# Patient Record
Sex: FEMALE | Race: WHITE | Employment: FULL TIME | URBAN - METROPOLITAN AREA
[De-identification: names, ages, dates, MRNs, and addresses within clinical notes are randomized per-mention and may not be internally consistent; named-entity substitution may affect disease eponyms.]

---

## 2017-01-05 ENCOUNTER — OFFICE VISIT (OUTPATIENT)
Dept: FAMILY MEDICINE | Facility: CLINIC | Age: 37
End: 2017-01-05

## 2017-01-05 VITALS
HEART RATE: 108 BPM | DIASTOLIC BLOOD PRESSURE: 74 MMHG | BODY MASS INDEX: 21.48 KG/M2 | TEMPERATURE: 97.9 F | HEIGHT: 69 IN | RESPIRATION RATE: 14 BRPM | SYSTOLIC BLOOD PRESSURE: 123 MMHG | WEIGHT: 145 LBS

## 2017-01-05 DIAGNOSIS — F98.8 ADD (ATTENTION DEFICIT DISORDER): Primary | ICD-10-CM

## 2017-01-05 DIAGNOSIS — J45.20 INTERMITTENT ASTHMA, UNCOMPLICATED: ICD-10-CM

## 2017-01-05 PROCEDURE — 99213 OFFICE O/P EST LOW 20 MIN: CPT | Performed by: FAMILY MEDICINE

## 2017-01-05 RX ORDER — DEXTROAMPHETAMINE SACCHARATE, AMPHETAMINE ASPARTATE MONOHYDRATE, DEXTROAMPHETAMINE SULFATE AND AMPHETAMINE SULFATE 7.5; 7.5; 7.5; 7.5 MG/1; MG/1; MG/1; MG/1
30 CAPSULE, EXTENDED RELEASE ORAL DAILY
Qty: 30 CAPSULE | Refills: 0 | Status: SHIPPED | OUTPATIENT
Start: 2017-01-05 | End: 2017-01-31

## 2017-01-05 RX ORDER — ALBUTEROL SULFATE 90 UG/1
2 AEROSOL, METERED RESPIRATORY (INHALATION) EVERY 6 HOURS PRN
Qty: 1 INHALER | Refills: 6 | Status: SHIPPED | OUTPATIENT
Start: 2017-01-05 | End: 2017-09-21

## 2017-01-05 RX ORDER — DEXTROAMPHETAMINE SACCHARATE, AMPHETAMINE ASPARTATE, DEXTROAMPHETAMINE SULFATE AND AMPHETAMINE SULFATE 5; 5; 5; 5 MG/1; MG/1; MG/1; MG/1
20 TABLET ORAL DAILY
Qty: 30 TABLET | Refills: 0 | Status: SHIPPED | OUTPATIENT
Start: 2017-01-05 | End: 2017-01-31

## 2017-01-05 NOTE — NURSING NOTE
"Chief Complaint   Patient presents with     Recheck Medication       Initial Ht 5' 9\" (1.753 m) Estimated body mass index is 22.58 kg/(m^2) as calculated from the following:    Height as of this encounter: 5' 9\" (1.753 m).    Weight as of 11/3/16: 153 lb (69.4 kg).  BP completed using cuff size: regular left arm Penelope Chowdhury MA      "

## 2017-01-05 NOTE — MR AVS SNAPSHOT
"              After Visit Summary   1/5/2017    Delicia Zhou    MRN: [id number]           Patient Information     Date Of Birth          1980        Visit Information        Provider Department      1/5/2017 11:15 AM Zak Nicole MD [facility]        Today's Diagnoses     ADD (attention deficit disorder)    -  1     Intermittent asthma, uncomplicated            Follow-ups after your visit        Who to contact     If you have questions or need follow up information about today's clinic visit or your schedule please contact [facility] directly at [phone].  Normal or non-critical lab and imaging results will be communicated to you by [facility]hart, letter or phone within 4 business days after the clinic has received the results. If you do not hear from us within 7 days, please contact the clinic through [employer]t or phone. If you have a critical or abnormal lab result, we will notify you by phone as soon as possible.  Submit refill requests through [employer]ore or call your pharmacy and they will forward the refill request to us. Please allow 3 business days for your refill to be completed.          Additional Information About Your Visit        MyChart Information     Splore gives you secure access to your electronic health record. If you see a primary care provider, you can also send messages to your care team and make appointments. If you have questions, please call your primary care clinic.  If you do not have a primary care provider, please call [phone] and they will assist you.        Care EveryWhere ID     This is your Care EveryWhere ID. This could be used by other organizations to access your [place] medical records  [id number]        Your Vitals Were     Pulse Temperature Respirations Height BMI (Body Mass Index) Last Period    108 97.9  F (36.6  C) (Oral) 14 5' 9\" (1.753 m) 21.40 kg/m2 01/03/2017    Breastfeeding?                   No            Blood " Pressure from Last 3 Encounters:   01/05/17 123/74   11/03/16 114/66   04/22/16 120/96    Weight from Last 3 Encounters:   01/05/17 145 lb (65.772 kg)   11/03/16 153 lb (69.4 kg)   04/22/16 145 lb 12.8 oz (66.134 kg)              Today, you had the following     No orders found for display         Where to get your medicines      Some of these will need a paper prescription and others can be bought over the counter.  Ask your nurse if you have questions.     Bring a paper prescription for each of these medications    - albuterol 108 (90 BASE) MCG/ACT Inhaler  - amphetamine-dextroamphetamine 20 MG per tablet  - amphetamine-dextroamphetamine 30 MG per 24 hr capsule       Primary Care Provider Office Phone # Fax #    Zak Nicole [phone]       [address] [address]        Thank you!     Thank you for choosing [facility]  for your care. Our goal is always to provide you with excellent care. Hearing back from our patients is one way we can continue to improve our services. Please take a few minutes to complete the written survey that you may receive in the mail after your visit with us. Thank you!             Your Updated Medication List - Protect others around you: Learn how to safely use, store and throw away your medicines at www.disposemymeds.org.          This list is accurate as of: 1/5/17  9:00 PM.  Always use your most recent med list.                   Brand Name Dispense Instructions for use    albuterol 108 (90 BASE) MCG/ACT Inhaler    PROAIR HFA/PROVENTIL HFA/VENTOLIN HFA    1 Inhaler    Inhale 2 puffs into the lungs every 6 hours as needed for shortness of breath / dyspnea       * amphetamine-dextroamphetamine 20 MG per tablet    ADDERALL    30 tablet    Take 1 tablet (20 mg) by mouth daily       * amphetamine-dextroamphetamine 30 MG per 24 hr capsule    ADDERALL XR    30 capsule    Take 1 capsule (30 mg) by mouth daily        cetirizine 10 MG tablet    zyrTEC    90 tablet    Take 1 tablet (10 mg) by mouth every evening       * Notice:  This list has 2 medication(s) that are the same as other medications prescribed for you. Read the directions carefully, and ask your doctor or other care provider to review them with you.

## 2017-01-05 NOTE — PROGRESS NOTES
"  SUBJECTIVE:                                                    Delicia Zhou is a 36 year old female who presents to clinic today for the following health issues:      Medication Followup of Adderall    Taking Medication as prescribed: yes    Side Effects:  None    Medication Helping Symptoms:  yes     ADD (attention deficit disorder)  (primary encounter diagnosis) -Some medicine confusion, controlled contract violation, pt claims unaware of details of contract (\" I never read anything\") had testing in 2015:  no record      Intermittent asthma, uncomplicated - Well controlled.      ROS:  Sleeps well      This document serves as a record of the services and decisions personally performed and made by Zak Nicole MD. It was created on his behalf by Linda Macdonald, a trained medical scribe. The creation of this document is based the provider's statements to the medical scribe. 11:53 AM January 5, 2017    OBJECTIVE:                                                    /74 mmHg  Pulse 108  Temp(Src) 97.9  F (36.6  C) (Oral)  Resp 14  Ht 5' 9\" (1.753 m)  Wt 145 lb (65.772 kg)  BMI 21.40 kg/m2  LMP 01/03/2017  Breastfeeding? No  Body mass index is 21.4 kg/(m^2).              ASSESSMENT/PLAN:                                                      (F98.8) ADD (attention deficit disorder)  (primary encounter diagnosis)  Comment: controlled substance agreement filed  Records re psychevalrequested. Visit q 6 mos if all copacetic  Plan: amphetamine-dextroamphetamine (ADDERALL) 20 MG         per tablet, amphetamine-dextroamphetamine         (ADDERALL XR) 30 MG per 24 hr capsule    (J45.20) Intermittent asthma, uncomplicated  Comment: Controlled. refill  Plan: albuterol (PROAIR HFA/PROVENTIL HFA/VENTOLIN         HFA) 108 (90 BASE) MCG/ACT Inhaler          RCK in 6 mos. 1 mos Rx today  Review for receipt of records before refill    The information in this document, created by a scribe for me, accurately reflects the " services I personally performed and the decisions made by me. I have reviewed and approved this document for accuracy.  11:54 AM January 5, 2017    Zak Nicole MD  [facility]

## 2017-01-17 ENCOUNTER — TELEPHONE (OUTPATIENT)
Dept: FAMILY MEDICINE | Facility: CLINIC | Age: 37
End: 2017-01-17

## 2017-01-17 ENCOUNTER — E-VISIT (OUTPATIENT)
Dept: FAMILY MEDICINE | Facility: CLINIC | Age: 37
End: 2017-01-17

## 2017-01-17 DIAGNOSIS — F41.9 ANXIETY: Primary | ICD-10-CM

## 2017-01-17 PROCEDURE — 99444 ZZC PHYSICIAN ONLINE EVALUATION & MANAGEMENT SERVICE: CPT | Performed by: FAMILY MEDICINE

## 2017-01-17 RX ORDER — BUPROPION HYDROCHLORIDE 150 MG/1
150 TABLET ORAL EVERY MORNING
Qty: 30 TABLET | Refills: 1 | Status: SHIPPED | OUTPATIENT
Start: 2017-01-17 | End: 2017-09-21

## 2017-01-17 NOTE — TELEPHONE ENCOUNTER
Pt calls re anxiety, would like to start Rx (specified wellbutrin) informed visit needed for new Rx.   Pt will try submitting e-visit and if OV is required per Dr. Nicole will scheduled.   Last OV 1/5/17 anxiety discussed.   Jesu Sharma, RN

## 2017-01-31 DIAGNOSIS — F98.8 ADD (ATTENTION DEFICIT DISORDER): Primary | ICD-10-CM

## 2017-01-31 RX ORDER — DEXTROAMPHETAMINE SACCHARATE, AMPHETAMINE ASPARTATE, DEXTROAMPHETAMINE SULFATE AND AMPHETAMINE SULFATE 5; 5; 5; 5 MG/1; MG/1; MG/1; MG/1
20 TABLET ORAL DAILY
Qty: 30 TABLET | Refills: 0 | Status: SHIPPED | OUTPATIENT
Start: 2017-03-06 | End: 2017-03-30

## 2017-01-31 RX ORDER — DEXTROAMPHETAMINE SACCHARATE, AMPHETAMINE ASPARTATE MONOHYDRATE, DEXTROAMPHETAMINE SULFATE AND AMPHETAMINE SULFATE 7.5; 7.5; 7.5; 7.5 MG/1; MG/1; MG/1; MG/1
30 CAPSULE, EXTENDED RELEASE ORAL DAILY
Qty: 30 CAPSULE | Refills: 0 | Status: SHIPPED | OUTPATIENT
Start: 2017-03-06 | End: 2017-03-30

## 2017-01-31 RX ORDER — DEXTROAMPHETAMINE SACCHARATE, AMPHETAMINE ASPARTATE, DEXTROAMPHETAMINE SULFATE AND AMPHETAMINE SULFATE 5; 5; 5; 5 MG/1; MG/1; MG/1; MG/1
20 TABLET ORAL DAILY
Qty: 30 TABLET | Refills: 0 | Status: SHIPPED | OUTPATIENT
Start: 2017-02-04 | End: 2017-01-31

## 2017-01-31 RX ORDER — DEXTROAMPHETAMINE SACCHARATE, AMPHETAMINE ASPARTATE MONOHYDRATE, DEXTROAMPHETAMINE SULFATE AND AMPHETAMINE SULFATE 7.5; 7.5; 7.5; 7.5 MG/1; MG/1; MG/1; MG/1
30 CAPSULE, EXTENDED RELEASE ORAL DAILY
Qty: 30 CAPSULE | Refills: 0 | Status: SHIPPED | OUTPATIENT
Start: 2017-02-04 | End: 2017-01-31

## 2017-01-31 NOTE — TELEPHONE ENCOUNTER
Controlled Substance Refill Request for Adderall 20mg and 30mg-pt will be in area tomorrow, would like to  then, knows it is due 02/04/17  Problem List Complete:  Yes  4/24/14 note diagnostic criteria copied  11/2015 pt reports pregnancy amphetamine use to minimize weight gain    Patient is followed by SCOTT MAYO for ongoing prescription of stimulants.  All refills should be approved by this provider, or covering partner.    Medication(s): Adderall 20 mg and XR 30 mg.    Maximum quantity per month: 30 and 30  Clinic visit frequency required: Q 6  months     Controlled substance agreement on file: Yes       Date(s): 11/10/15  Neuropsych evaluation for ADD completed:  No and Yes, completed 9/2014- I do not see she ever completed, on file but diagnosis not confirmed    Last [facility] website verification:  11/28/16  Last Written Prescription Date:  01/05/17  Last Fill Quantity: 30,   # refills: 0    Last Office Visit with [facility] primary care provider: 01/05/17 w/Dr Mayo    Clinic visit frequency required: Q 6  months     Future Office visit:     Controlled substance agreement on file: Yes:  Date 11/10/15.     Processing:  Patient will  in clinic   checked in past 6 months?  Yes 11/28/16

## 2017-02-09 ENCOUNTER — OFFICE VISIT (OUTPATIENT)
Dept: FAMILY MEDICINE | Facility: CLINIC | Age: 37
End: 2017-02-09

## 2017-02-09 VITALS
TEMPERATURE: 97.8 F | BODY MASS INDEX: 20.86 KG/M2 | HEART RATE: 105 BPM | HEIGHT: 69 IN | WEIGHT: 140.8 LBS | RESPIRATION RATE: 14 BRPM | SYSTOLIC BLOOD PRESSURE: 137 MMHG | DIASTOLIC BLOOD PRESSURE: 84 MMHG

## 2017-02-09 DIAGNOSIS — M54.2 CERVICALGIA: Primary | ICD-10-CM

## 2017-02-09 PROCEDURE — 99214 OFFICE O/P EST MOD 30 MIN: CPT | Performed by: FAMILY MEDICINE

## 2017-02-09 RX ORDER — MELOXICAM 15 MG/1
15 TABLET ORAL DAILY
Qty: 30 TABLET | Refills: 1 | Status: SHIPPED | OUTPATIENT
Start: 2017-02-09 | End: 2017-09-21

## 2017-02-09 NOTE — MR AVS SNAPSHOT
After Visit Summary   2/9/2017    Delicia Zhou    MRN: [id number]           Patient Information     Date Of Birth          1980        Visit Information        Provider Department      2/9/2017 10:00 AM Zak Nicole MD [facility]        Today's Diagnoses     Cervicalgia    -  1        Follow-ups after your visit        Additional Services     [facility] PT, HAND, AND CHIROPRACTIC REFERRAL       **This order will print in the [facility] Scheduling Office**    Physical Therapy, Hand Therapy and Chiropractic Care are available through:    *[facility]  *[facility]  *[facility]    Call one number to schedule at any of the above locations: [phone].    Your provider has referred you to: As Indicated:     Indication/Reason for Referral: cervicalgia  Onset of Illness: Yesterday  Therapy Orders: Evaluate and Treat  Special Programs: None and [facility] Sp[facility]  Special Request:     José Miguel Valdez      Additional Comments for the Therapist or Chiropractor:     Please be aware that coverage of these services is subject to the terms and limitations of your health insurance plan.  Call member services at your health plan with any benefit or coverage questions.      Please bring the following to your appointment:    *Your personal calendar for scheduling future appointments  *Comfortable clothing                  Your next 10 appointments already scheduled     Feb 20, 2017  9:30 AM   [facility] Spine with Arvin Hensley PT   [facility]    [address]  [facility] [address] [phone]              Who to contact     If you have questions or need follow up information about today's clinic visit or your schedule please contact [facility] directly at [phone].  Normal or non-critical lab and imaging results will be communicated to you by  "MyChart, letter or phone within 4 business days after the clinic has received the results. If you do not hear from us within 7 days, please contact the clinic through [employer] or phone. If you have a critical or abnormal lab result, we will notify you by phone as soon as possible.  Submit refill requests through [employer] or call your pharmacy and they will forward the refill request to us. Please allow 3 business days for your refill to be completed.          Additional Information About Your Visit        happnharKuona Information     [employer] gives you secure access to your electronic health record. If you see a primary care provider, you can also send messages to your care team and make appointments. If you have questions, please call your primary care clinic.  If you do not have a primary care provider, please call [phone] and they will assist you.        Care EveryWhere ID     This is your Care EveryWhere ID. This could be used by other organizations to access your [place] medical records  [id number]        Your Vitals Were     Pulse Temperature Respirations Height BMI (Body Mass Index) Last Period    105 97.8  F (36.6  C) (Oral) 14 5' 9\" (1.753 m) 20.78 kg/m2 01/19/2017    Breastfeeding?                   No            Blood Pressure from Last 3 Encounters:   02/09/17 137/84   01/05/17 123/74   11/03/16 114/66    Weight from Last 3 Encounters:   02/09/17 140 lb 12.8 oz (63.866 kg)   01/05/17 145 lb (65.772 kg)   11/03/16 153 lb (69.4 kg)              We Performed the Following     [facility] PT, HAND, AND CHIROPRACTIC REFERRAL          Today's Medication Changes          These changes are accurate as of: 2/9/17  4:49 PM.  If you have any questions, ask your nurse or doctor.               Start taking these medicines.        Dose/Directions    meloxicam 15 MG tablet   Commonly known as:  MOBIC   Used for:  Cervicalgia   Started by:  Zak Nicole MD        Dose:  15 mg   Take 1 tablet (15 mg) by mouth daily "   Quantity:  30 tablet   Refills:  1            Where to get your medicines      These medications were sent to [facility] [id number] [place] [address]  [address] [facility] [address]     Phone:  [phone]    - meloxicam 15 MG tablet             Primary Care Provider Office Phone # Fax #    Zak Nicole [phone]       [facility] [address] [place] [address]        Thank you!     Thank you for choosing [facility]  for your care. Our goal is always to provide you with excellent care. Hearing back from our patients is one way we can continue to improve our services. Please take a few minutes to complete the written survey that you may receive in the mail after your visit with us. Thank you!             Your Updated Medication List - Protect others around you: Learn how to safely use, store and throw away your medicines at www.disposemymeds.org.          This list is accurate as of: 2/9/17  4:49 PM.  Always use your most recent med list.                   Brand Name Dispense Instructions for use    albuterol 108 (90 BASE) MCG/ACT Inhaler    PROAIR HFA/PROVENTIL HFA/VENTOLIN HFA    1 Inhaler    Inhale 2 puffs into the lungs every 6 hours as needed for shortness of breath / dyspnea       * amphetamine-dextroamphetamine 30 MG per 24 hr capsule   Start taking on:  3/6/2017    ADDERALL XR    30 capsule    Take 1 capsule (30 mg) by mouth daily       * amphetamine-dextroamphetamine 20 MG per tablet   Start taking on:  3/6/2017    ADDERALL    30 tablet    Take 1 tablet (20 mg) by mouth daily       buPROPion 150 MG 24 hr tablet    WELLBUTRIN XL    30 tablet    Take 1 tablet (150 mg) by mouth every morning       cetirizine 10 MG tablet    zyrTEC    90 tablet    Take 1 tablet (10 mg) by mouth every evening       meloxicam 15 MG tablet    MOBIC    30 tablet    Take 1 tablet (15 mg) by mouth daily       * Notice:  This list  has 2 medication(s) that are the same as other medications prescribed for you. Read the directions carefully, and ask your doctor or other care provider to review them with you.
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
## 2017-02-10 ASSESSMENT — ASTHMA QUESTIONNAIRES: ACT_TOTALSCORE: 25

## 2017-03-30 DIAGNOSIS — F98.8 ADD (ATTENTION DEFICIT DISORDER): ICD-10-CM

## 2017-03-30 NOTE — TELEPHONE ENCOUNTER
Controlled Substance Refill Request for ADDERALL  Problem List Complete:  Yes    4/24/14 note diagnostic criteria copied  11/2015 pt reports pregnancy amphetamine use to minimize weight gain     Patient is followed by SCOTT MAYO for ongoing prescription of stimulants. All refills should be approved by this provider, or covering partner.     Medication(s): Adderall 20 mg and XR 30 mg.   Maximum quantity per month: 30 and 30  Clinic visit frequency required: Q 6 months      Controlled substance agreement on file: Yes  Date(s): 11/10/15  Neuropsych evaluation for ADD completed: No and Yes, completed 9/2014- I do not see she ever completed, on file but diagnosis not confirmed     Last [facility] website verification: 11/28/16  [email or web address]   checked in past 6 months?  Yes 11/28/16

## 2017-03-31 RX ORDER — DEXTROAMPHETAMINE SACCHARATE, AMPHETAMINE ASPARTATE, DEXTROAMPHETAMINE SULFATE AND AMPHETAMINE SULFATE 5; 5; 5; 5 MG/1; MG/1; MG/1; MG/1
20 TABLET ORAL DAILY
Qty: 30 TABLET | Refills: 0 | Status: SHIPPED | OUTPATIENT
Start: 2017-04-30 | End: 2017-03-31

## 2017-03-31 RX ORDER — DEXTROAMPHETAMINE SACCHARATE, AMPHETAMINE ASPARTATE, DEXTROAMPHETAMINE SULFATE AND AMPHETAMINE SULFATE 5; 5; 5; 5 MG/1; MG/1; MG/1; MG/1
20 TABLET ORAL DAILY
Qty: 30 TABLET | Refills: 0 | Status: SHIPPED | OUTPATIENT
Start: 2017-05-30 | End: 2017-06-28

## 2017-03-31 RX ORDER — DEXTROAMPHETAMINE SACCHARATE, AMPHETAMINE ASPARTATE MONOHYDRATE, DEXTROAMPHETAMINE SULFATE AND AMPHETAMINE SULFATE 7.5; 7.5; 7.5; 7.5 MG/1; MG/1; MG/1; MG/1
30 CAPSULE, EXTENDED RELEASE ORAL DAILY
Qty: 30 CAPSULE | Refills: 0 | Status: SHIPPED | OUTPATIENT
Start: 2017-03-31 | End: 2017-03-31

## 2017-03-31 RX ORDER — DEXTROAMPHETAMINE SACCHARATE, AMPHETAMINE ASPARTATE MONOHYDRATE, DEXTROAMPHETAMINE SULFATE AND AMPHETAMINE SULFATE 7.5; 7.5; 7.5; 7.5 MG/1; MG/1; MG/1; MG/1
30 CAPSULE, EXTENDED RELEASE ORAL DAILY
Qty: 30 CAPSULE | Refills: 0 | Status: SHIPPED | OUTPATIENT
Start: 2017-05-30 | End: 2017-06-28

## 2017-03-31 RX ORDER — DEXTROAMPHETAMINE SACCHARATE, AMPHETAMINE ASPARTATE, DEXTROAMPHETAMINE SULFATE AND AMPHETAMINE SULFATE 5; 5; 5; 5 MG/1; MG/1; MG/1; MG/1
20 TABLET ORAL DAILY
Qty: 30 TABLET | Refills: 0 | Status: SHIPPED | OUTPATIENT
Start: 2017-03-31 | End: 2017-03-31

## 2017-03-31 RX ORDER — DEXTROAMPHETAMINE SACCHARATE, AMPHETAMINE ASPARTATE MONOHYDRATE, DEXTROAMPHETAMINE SULFATE AND AMPHETAMINE SULFATE 7.5; 7.5; 7.5; 7.5 MG/1; MG/1; MG/1; MG/1
30 CAPSULE, EXTENDED RELEASE ORAL DAILY
Qty: 30 CAPSULE | Refills: 0 | Status: SHIPPED | OUTPATIENT
Start: 2017-04-30 | End: 2017-03-31

## 2017-03-31 NOTE — TELEPHONE ENCOUNTER
Left patient a VM to advise her that her prescriptions are available for  at the [facility] .     Mirian Ramirez/

## 2017-03-31 NOTE — TELEPHONE ENCOUNTER
Not PSO med.  Sent to provider.  Pham Marie, RN    1/5/17  RCK in 6 mos. 1 mos Rx today  Review for receipt of records before refill

## 2017-04-10 ENCOUNTER — TELEPHONE (OUTPATIENT)
Dept: FAMILY MEDICINE | Facility: CLINIC | Age: 37
End: 2017-04-10

## 2017-04-10 NOTE — TELEPHONE ENCOUNTER
[facility] pharmacy calls re Adderall. Pt wishes to  Rx today but they only have #29 in stock. Pt accepted that prescription one short.   FYI for next refill will appear one day early but is not.   Pharmacist thanked for update.    Jesu Sharma, RN

## 2017-06-02 ENCOUNTER — TELEPHONE (OUTPATIENT)
Dept: NURSING | Facility: CLINIC | Age: 37
End: 2017-06-02

## 2017-06-28 DIAGNOSIS — F98.8 ATTENTION DEFICIT DISORDER (ADD) WITHOUT HYPERACTIVITY: Primary | ICD-10-CM

## 2017-06-28 NOTE — TELEPHONE ENCOUNTER
update and to Dr. Nicole.  Adderall 30 mg filled 6/7/17 so due 7/7/17.  Adderall 20 mg filled 5/30/17 so due 6/29/17.  Last ADD visit 1/5/17.  Due for visit soon.  Letter sent.  Not PSO med.  Sent to provider.  Pham Marie RN

## 2017-06-28 NOTE — LETTER
[facility]  [address] [place], [address]  [phone]        June 28, 2017    Delicia Zhou                                                                                                                                   [address] [place] [address]            We recently received a call from your pharmacy requesting a refill of Adderall.     A review of your chart indicates that an appointment is required with your provider for 6 month med check-due 7/5/17. Please call the clinic at [phone] to schedule your appointment.     Taking care of your health is important to us and ongoing visits with your provider are vital to your care.  We look forward to seeing you in the near future.     Sincerely,     [facility]

## 2017-06-28 NOTE — TELEPHONE ENCOUNTER
Controlled Substance Refill Request for amphetamine-dextroamphetamine (ADDERALL) 20 MG per tablet  Problem List Complete:  Yes    Patient is followed by SCOTT MAYO for ongoing prescription of stimulants.  All refills should be approved by this provider, or covering partner.     Medication(s): Adderall 20 mg and XR 30 mg.   Maximum quantity per month: 30 and 30  Clinic visit frequency required: Q 6  months      Controlled substance agreement on file: Yes       Date(s): 11/10/15  Neuropsych evaluation for ADD completed:  No and Yes, completed 9/2014- I do not see she ever completed, on file but diagnosis not confirmed     Last [facility] website verification:  11/28/16    Last Written Prescription Date:  05/30/17  Last Fill Quantity: 30,   # refills: 0    Last Office Visit with [facility] primary care provider: 02/09/17    Clinic visit frequency required: Q 6  months     Future Office visit:     Controlled substance agreement on file: Yes:  Date 11/10/15.     Processing:  Patient will  in clinic   checked in past 6 months?  No, route to RN       Controlled Substance Refill Request for amphetamine-dextroamphetamine (ADDERALL XR) 30 MG per 24 hr capsule  Problem List Complete:  Yes   checked in past 6 months?  No, route to RN

## 2017-06-29 RX ORDER — DEXTROAMPHETAMINE SACCHARATE, AMPHETAMINE ASPARTATE, DEXTROAMPHETAMINE SULFATE AND AMPHETAMINE SULFATE 5; 5; 5; 5 MG/1; MG/1; MG/1; MG/1
20 TABLET ORAL DAILY
Qty: 30 TABLET | Refills: 0 | Status: SHIPPED | OUTPATIENT
Start: 2017-06-29 | End: 2017-08-01

## 2017-06-29 RX ORDER — DEXTROAMPHETAMINE SACCHARATE, AMPHETAMINE ASPARTATE MONOHYDRATE, DEXTROAMPHETAMINE SULFATE AND AMPHETAMINE SULFATE 7.5; 7.5; 7.5; 7.5 MG/1; MG/1; MG/1; MG/1
30 CAPSULE, EXTENDED RELEASE ORAL DAILY
Qty: 30 CAPSULE | Refills: 0 | Status: SHIPPED | OUTPATIENT
Start: 2017-07-07 | End: 2017-08-01

## 2017-06-29 NOTE — TELEPHONE ENCOUNTER
Called and left patient a voicemail to advise that her prescriptions are at the  for patient .    Mirian Ramirez/

## 2017-08-01 DIAGNOSIS — F98.8 ATTENTION DEFICIT DISORDER (ADD) WITHOUT HYPERACTIVITY: ICD-10-CM

## 2017-08-01 NOTE — TELEPHONE ENCOUNTER
refil of Adderall pt states she doesn't get her insurance reinstated until sept she will make appt then  Call pt at  when ready to pickup  Vicki Garcia/

## 2017-08-02 RX ORDER — DEXTROAMPHETAMINE SACCHARATE, AMPHETAMINE ASPARTATE MONOHYDRATE, DEXTROAMPHETAMINE SULFATE AND AMPHETAMINE SULFATE 7.5; 7.5; 7.5; 7.5 MG/1; MG/1; MG/1; MG/1
30 CAPSULE, EXTENDED RELEASE ORAL DAILY
Qty: 30 CAPSULE | Refills: 0 | Status: SHIPPED | OUTPATIENT
Start: 2017-08-02 | End: 2017-09-14

## 2017-08-02 RX ORDER — DEXTROAMPHETAMINE SACCHARATE, AMPHETAMINE ASPARTATE, DEXTROAMPHETAMINE SULFATE AND AMPHETAMINE SULFATE 5; 5; 5; 5 MG/1; MG/1; MG/1; MG/1
20 TABLET ORAL DAILY
Qty: 30 TABLET | Refills: 0 | Status: SHIPPED | OUTPATIENT
Start: 2017-08-02 | End: 2017-09-14

## 2017-08-02 NOTE — TELEPHONE ENCOUNTER
Pt. Due for Q6 month office visit per Controlled Substance Agreement.    Pt. Reported no health insurance until September.    RX [place] reviewed:  reviewed- recommend provider review    [place]  [email or web address]      Last fills:  Adderall 30  7/9/2017, #30  6/7/2017, #30    Adderall 20mg   7/5/2017, #30  5/30/2017, #30    [place]   7/9/2017 #15 (Davide Barros DDS)  6/29/2017, #15  6/3/2017, #10 (Ivan Argueta MD)    Kamryn TONY RN, BSN, PHN  [facility]FF[facility]

## 2017-08-02 NOTE — TELEPHONE ENCOUNTER
Adderall 20mg      Last Written Prescription Date:  6/29/2017  Last Fill Quantity: 30,   # refills: 0  Last Office Visit with [facility], [facility] or [facility] prescribing provider: 2/9/2017  Future Office visit:       Routing refill request to provider for review/approval because:  Drug not on the [facility], [facility] or [facility] refill protocol or controlled substance      Adderall   XR 30mg    Last Written Prescription Date:  7/7/2017  Last Fill Quantity: 30,   # refills: 0  Last Office Visit with [facility], [facility] or [facility] prescribing provider: 2/9/2017  Future Office visit:       Routing refill request to provider for review/approval because:  Drug not on the [facility], [facility] or [facility] refill protocol or controlled substance    Kamryn TONY RN, BSN, PHN  Arnold Flex RN

## 2017-08-02 NOTE — TELEPHONE ENCOUNTER
Patient checking on status of refill. Mad it's not ready and no one has called her yet.    Explained 3-5 day required on all refills, explained also due for an appointment.    States she's been taking this medication for 10 years and has never had to wait more that a day for her fill.    Please call patient ASAP and let her know when ready for . Aware PCP out of office as well.

## 2017-08-03 NOTE — TELEPHONE ENCOUNTER
Tried calling patient to inform ADDERALL Rx x2 is up front, ready for . Number on file [phone] says mailbox is full, unable to leave a message. Ruth Behrens.

## 2017-09-14 DIAGNOSIS — F98.8 ATTENTION DEFICIT DISORDER (ADD) WITHOUT HYPERACTIVITY: ICD-10-CM

## 2017-09-14 RX ORDER — DEXTROAMPHETAMINE SACCHARATE, AMPHETAMINE ASPARTATE, DEXTROAMPHETAMINE SULFATE AND AMPHETAMINE SULFATE 5; 5; 5; 5 MG/1; MG/1; MG/1; MG/1
20 TABLET ORAL DAILY
Qty: 8 TABLET | Refills: 0 | Status: SHIPPED | OUTPATIENT
Start: 2017-09-14 | End: 2017-09-21

## 2017-09-14 RX ORDER — DEXTROAMPHETAMINE SACCHARATE, AMPHETAMINE ASPARTATE MONOHYDRATE, DEXTROAMPHETAMINE SULFATE AND AMPHETAMINE SULFATE 7.5; 7.5; 7.5; 7.5 MG/1; MG/1; MG/1; MG/1
30 CAPSULE, EXTENDED RELEASE ORAL DAILY
Qty: 8 CAPSULE | Refills: 0 | Status: SHIPPED | OUTPATIENT
Start: 2017-09-14 | End: 2017-09-21

## 2017-09-14 NOTE — TELEPHONE ENCOUNTER
Patient calling and states out of medications and called to schedule med check and now Dr. Nicole not in.  States last time came to see someone else and Dr. Nicole still wanted her to come see him so does not want to come see someone else then have to come see him again also.  States pushed August visit out because had new job and did not have insurance til September.  Scheduled her for 1st available with Dr. Nicole 9/21/17 8:45 am and advised would route to covering provider to see if OK to fill for enough til appt.  T'd up for enough til appt.  Please advise.  Pham Marie RN

## 2017-09-14 NOTE — TELEPHONE ENCOUNTER
"Tried to call patient , msg on phone #[phone] says \"voice mail is full\" unable to leave a message. Adderall Rx x2 is up front, ready for . Ruth Behrens.   "

## 2017-09-21 ENCOUNTER — OFFICE VISIT (OUTPATIENT)
Dept: FAMILY MEDICINE | Facility: CLINIC | Age: 37
End: 2017-09-21
Payer: COMMERCIAL

## 2017-09-21 DIAGNOSIS — F98.8 ATTENTION DEFICIT DISORDER (ADD) WITHOUT HYPERACTIVITY: ICD-10-CM

## 2017-09-21 DIAGNOSIS — Z79.899 CONTROLLED SUBSTANCE AGREEMENT SIGNED: ICD-10-CM

## 2017-09-21 DIAGNOSIS — Z00.00 ROUTINE GENERAL MEDICAL EXAMINATION AT A HEALTH CARE FACILITY: Primary | ICD-10-CM

## 2017-09-21 DIAGNOSIS — J45.20 INTERMITTENT ASTHMA, UNCOMPLICATED: ICD-10-CM

## 2017-09-21 PROCEDURE — 99395 PREV VISIT EST AGE 18-39: CPT | Performed by: FAMILY MEDICINE

## 2017-09-21 RX ORDER — ALBUTEROL SULFATE 90 UG/1
2 AEROSOL, METERED RESPIRATORY (INHALATION) EVERY 6 HOURS PRN
Qty: 1 INHALER | Refills: 6 | Status: SHIPPED | OUTPATIENT
Start: 2017-09-21 | End: 2018-03-13

## 2017-09-21 RX ORDER — DEXTROAMPHETAMINE SACCHARATE, AMPHETAMINE ASPARTATE MONOHYDRATE, DEXTROAMPHETAMINE SULFATE AND AMPHETAMINE SULFATE 7.5; 7.5; 7.5; 7.5 MG/1; MG/1; MG/1; MG/1
30 CAPSULE, EXTENDED RELEASE ORAL DAILY
Qty: 30 CAPSULE | Refills: 0 | Status: SHIPPED | OUTPATIENT
Start: 2017-10-21 | End: 2017-12-14

## 2017-09-21 RX ORDER — DEXTROAMPHETAMINE SACCHARATE, AMPHETAMINE ASPARTATE, DEXTROAMPHETAMINE SULFATE AND AMPHETAMINE SULFATE 5; 5; 5; 5 MG/1; MG/1; MG/1; MG/1
20 TABLET ORAL DAILY
Qty: 30 TABLET | Refills: 0 | Status: SHIPPED | OUTPATIENT
Start: 2017-11-20 | End: 2018-03-13

## 2017-09-21 RX ORDER — DEXTROAMPHETAMINE SACCHARATE, AMPHETAMINE ASPARTATE MONOHYDRATE, DEXTROAMPHETAMINE SULFATE AND AMPHETAMINE SULFATE 7.5; 7.5; 7.5; 7.5 MG/1; MG/1; MG/1; MG/1
30 CAPSULE, EXTENDED RELEASE ORAL DAILY
Qty: 30 CAPSULE | Refills: 0 | Status: SHIPPED | OUTPATIENT
Start: 2017-11-20 | End: 2017-12-13

## 2017-09-21 RX ORDER — DEXTROAMPHETAMINE SACCHARATE, AMPHETAMINE ASPARTATE MONOHYDRATE, DEXTROAMPHETAMINE SULFATE AND AMPHETAMINE SULFATE 7.5; 7.5; 7.5; 7.5 MG/1; MG/1; MG/1; MG/1
30 CAPSULE, EXTENDED RELEASE ORAL DAILY
Qty: 8 CAPSULE | Refills: 0 | Status: CANCELLED | OUTPATIENT
Start: 2017-09-21

## 2017-09-21 RX ORDER — DEXTROAMPHETAMINE SACCHARATE, AMPHETAMINE ASPARTATE MONOHYDRATE, DEXTROAMPHETAMINE SULFATE AND AMPHETAMINE SULFATE 7.5; 7.5; 7.5; 7.5 MG/1; MG/1; MG/1; MG/1
30 CAPSULE, EXTENDED RELEASE ORAL DAILY
Qty: 30 CAPSULE | Refills: 0 | Status: SHIPPED | OUTPATIENT
Start: 2017-09-21 | End: 2017-09-21

## 2017-09-21 RX ORDER — DEXTROAMPHETAMINE SACCHARATE, AMPHETAMINE ASPARTATE, DEXTROAMPHETAMINE SULFATE AND AMPHETAMINE SULFATE 5; 5; 5; 5 MG/1; MG/1; MG/1; MG/1
20 TABLET ORAL DAILY
Qty: 30 TABLET | Refills: 0 | Status: SHIPPED | OUTPATIENT
Start: 2017-10-21 | End: 2017-09-21

## 2017-09-21 RX ORDER — DEXTROAMPHETAMINE SACCHARATE, AMPHETAMINE ASPARTATE, DEXTROAMPHETAMINE SULFATE AND AMPHETAMINE SULFATE 5; 5; 5; 5 MG/1; MG/1; MG/1; MG/1
20 TABLET ORAL DAILY
Qty: 30 TABLET | Refills: 0 | Status: SHIPPED | OUTPATIENT
Start: 2017-11-20 | End: 2017-09-21

## 2017-09-21 RX ORDER — DEXTROAMPHETAMINE SACCHARATE, AMPHETAMINE ASPARTATE MONOHYDRATE, DEXTROAMPHETAMINE SULFATE AND AMPHETAMINE SULFATE 7.5; 7.5; 7.5; 7.5 MG/1; MG/1; MG/1; MG/1
30 CAPSULE, EXTENDED RELEASE ORAL DAILY
Qty: 30 CAPSULE | Refills: 0 | Status: SHIPPED | OUTPATIENT
Start: 2017-09-21 | End: 2018-03-05

## 2017-09-21 RX ORDER — DEXTROAMPHETAMINE SACCHARATE, AMPHETAMINE ASPARTATE, DEXTROAMPHETAMINE SULFATE AND AMPHETAMINE SULFATE 5; 5; 5; 5 MG/1; MG/1; MG/1; MG/1
20 TABLET ORAL DAILY
Qty: 30 TABLET | Refills: 0 | Status: SHIPPED | OUTPATIENT
Start: 2017-09-21 | End: 2017-09-21

## 2017-09-21 RX ORDER — DEXTROAMPHETAMINE SACCHARATE, AMPHETAMINE ASPARTATE, DEXTROAMPHETAMINE SULFATE AND AMPHETAMINE SULFATE 5; 5; 5; 5 MG/1; MG/1; MG/1; MG/1
20 TABLET ORAL DAILY
Qty: 30 TABLET | Refills: 0 | Status: SHIPPED | OUTPATIENT
Start: 2017-09-21 | End: 2017-12-14

## 2017-09-21 RX ORDER — DEXTROAMPHETAMINE SACCHARATE, AMPHETAMINE ASPARTATE MONOHYDRATE, DEXTROAMPHETAMINE SULFATE AND AMPHETAMINE SULFATE 7.5; 7.5; 7.5; 7.5 MG/1; MG/1; MG/1; MG/1
30 CAPSULE, EXTENDED RELEASE ORAL DAILY
Qty: 30 CAPSULE | Refills: 0 | Status: SHIPPED | OUTPATIENT
Start: 2017-10-21 | End: 2017-09-21

## 2017-09-21 RX ORDER — DEXTROAMPHETAMINE SACCHARATE, AMPHETAMINE ASPARTATE, DEXTROAMPHETAMINE SULFATE AND AMPHETAMINE SULFATE 5; 5; 5; 5 MG/1; MG/1; MG/1; MG/1
20 TABLET ORAL DAILY
Qty: 30 TABLET | Refills: 0 | Status: SHIPPED | OUTPATIENT
Start: 2017-10-21 | End: 2017-12-13

## 2017-09-21 RX ORDER — DEXTROAMPHETAMINE SACCHARATE, AMPHETAMINE ASPARTATE MONOHYDRATE, DEXTROAMPHETAMINE SULFATE AND AMPHETAMINE SULFATE 7.5; 7.5; 7.5; 7.5 MG/1; MG/1; MG/1; MG/1
30 CAPSULE, EXTENDED RELEASE ORAL DAILY
Qty: 30 CAPSULE | Refills: 0 | Status: SHIPPED | OUTPATIENT
Start: 2017-11-20 | End: 2017-09-21

## 2017-09-21 NOTE — LETTER
9/21/2017        MARIA M MIKE  [address] [place] [address]      Maria M:    Maria M, as a part of your physical, you need some lab tests. I have placed an order in the medical record. Please make a lab only appointment at any [facility], and they will do that lab.    Thank you    Yours,    Zak Nicole

## 2017-09-21 NOTE — PROGRESS NOTES
SUBJECTIVE:   CC: Delicia Zhou is an 37 year old woman who presents for preventive health visit.     Healthy Habits:    Do you get at least three servings of calcium containing foods daily (dairy, green leafy vegetables, etc.)? yes    Amount of exercise or daily activities, outside of work: 3 day(s) per week    Problems taking medications regularly No    Medication side effects: No    Have you had an eye exam in the past two years? yes    Do you see a dentist twice per year? yes    Do you have sleep apnea, excessive snoring or daytime drowsiness?no          Attention deficit disorder (ADD) without hyperactivity  Controlled substance agreement signed needs screen  Intermittent asthma, uncomplicated quiescent        Today's PHQ-2 Score:   PHQ-2 ( 1999 [employer]) 9/24/2014 4/24/2014   Q1: Little interest or pleasure in doing things 0 0   Q2: Feeling down, depressed or hopeless 0 0   PHQ-2 Score 0 0       Abuse: Current or Past(Physical, Sexual or Emotional)- No  Do you feel safe in your environment - Yes  Past Medical History:   Diagnosis Date     ADD (attention deficit disorder)     ADD +      Anxiety 11/26/2012    2x week- 3x week     Attention deficit disorder (ADD) without hyperactivity 6/29/2017     Body dysmorphic disorder 11/11/2015     Controlled substance agreement signed 4/23/2016    Corey Adderall XL 30 Adderall 20 #30 Ea q mo      Elevated blood pressure reading without diagnosis of hypertension 4/23/2016     Headache(784.0) 1994    MRI negative     Intermittent asthma     seasonal + using albuterol        Social History   Substance Use Topics     Smoking status: Never Smoker     Smokeless tobacco: Never Used     Alcohol use Yes      Comment: social     Alcohol, about 1 beer a day, sometimes more, sometimes less    Reviewed orders with patient.  Reviewed health maintenance and updated orders accordingly - Yes      Mammogram not appropriate for this patient based on age.    Pertinent mammograms are  reviewed under the imaging tab.  History of abnormal Pap smear: NO - age 30- 65 PAP every 3 years recommended    Reviewed and updated as needed this visit by clinical staff  Tobacco  Allergies  Meds  Med Hx  Surg Hx  Fam Hx  Soc Hx        Reviewed and updated as needed this visit by Provider            ROS:  C: NEGATIVE for fever, chills, change in weight  I: NEGATIVE for worrisome rashes, moles or lesions  E: NEGATIVE for vision changes or irritation  ENT: NEGATIVE for ear, mouth and throat problems  R: NEGATIVE for significant cough or SOB  B: NEGATIVE for masses, tenderness or discharge  CV: NEGATIVE for chest pain, palpitations or peripheral edema  GI: NEGATIVE for nausea, abdominal pain, heartburn, or change in bowel habits  : NEGATIVE for unusual urinary or vaginal symptoms. Periods are regular.  M: NEGATIVE for significant arthralgias or myalgia  N: NEGATIVE for weakness, dizziness or paresthesias  P: NEGATIVE for changes in mood or affect    OBJECTIVE:   There were no vitals taken for this visit.  EXAM:  GENERAL: healthy, alert and no distress  EYES: Eyes grossly normal to inspection, PERRL and conjunctivae and sclerae normal  HENT: ear canals and TM's normal, nose and mouth without ulcers or lesions  NECK: no adenopathy, no asymmetry, masses, or scars and thyroid normal to palpation  RESP: lungs clear to auscultation - no rales, rhonchi or wheezes  CV: regular rate and rhythm, normal S1 S2, no S3 or S4, no murmur, click or rub, no peripheral edema and peripheral pulses strong  ABDOMEN: soft, nontender, no hepatosplenomegaly, no masses and bowel sounds normal  PSYCH: mentation appears normal, affect normal/bright    ASSESSMENT/PLAN:   1. Routine general medical examination at a health care facility    - Lipid Profile (Chol, Trig, HDL, LDL calc); Future    2. Attention deficit disorder (ADD) without hyperactivity  3 mos Rx  - amphetamine-dextroamphetamine (ADDERALL) 20 MG per tablet; Take 1 tablet (20  "mg) by mouth daily  Dispense: 30 tablet; Refill: 0  - amphetamine-dextroamphetamine (ADDERALL XR) 30 MG per 24 hr capsule; Take 1 capsule (30 mg) by mouth daily  Dispense: 30 capsule; Refill: 0  - amphetamine-dextroamphetamine (ADDERALL) 20 MG per tablet; Take 1 tablet (20 mg) by mouth daily  Dispense: 30 tablet; Refill: 0  - amphetamine-dextroamphetamine (ADDERALL XR) 30 MG per 24 hr capsule; Take 1 capsule (30 mg) by mouth daily  Dispense: 30 capsule; Refill: 0  - amphetamine-dextroamphetamine (ADDERALL XR) 30 MG per 24 hr capsule; Take 1 capsule (30 mg) by mouth daily  Dispense: 30 capsule; Refill: 0  - amphetamine-dextroamphetamine (ADDERALL) 20 MG per tablet; Take 1 tablet (20 mg) by mouth daily  Dispense: 30 tablet; Refill: 0    3. Intermittent asthma, uncomplicated  refill  - albuterol (PROAIR HFA/PROVENTIL HFA/VENTOLIN HFA) 108 (90 BASE) MCG/ACT Inhaler; Inhale 2 puffs into the lungs every 6 hours as needed for shortness of breath / dyspnea  Dispense: 1 Inhaler; Refill: 6    4. Controlled substance agreement signed  Pt leaves without:  - Drug  Screen Comprehensive, Urine w/o Reported Meds (Pain Care Package); Future    COUNSELING:            reports that she has never smoked. She has never used smokeless tobacco.    Estimated body mass index is 20.79 kg/(m^2) as calculated from the following:    Height as of 2/9/17: 5' 9\" (1.753 m).    Weight as of 2/9/17: 140 lb 12.8 oz (63.9 kg).         Counseling Resources:  ATP IV Guidelines  Pooled Cohorts Equation Calculator  Breast Cancer Risk Calculator  FRAX Risk Assessment  ICSI Preventive Guidelines  Dietary Guidelines for Americans, 2010  USDA's MyPlate  ASA Prophylaxis  Lung CA Screening    Zak Nicole MD  [facility]"

## 2017-09-21 NOTE — Clinical Note
Call pt Inform her she needs lab (futured) she can do it at any [place] lab. We will not fill her medications if she does not do so before the month is out, as per her controlled substance contract Zak Nicole

## 2017-09-21 NOTE — PROGRESS NOTES
SUBJECTIVE:   Delicia Zhou is a 37 year old female who presents to clinic today for the following health issues:    Attention deficit disorder (ADD) without hyperactivity: Patient is no longer on buproprion.   Medication Followup of Adderall      Taking Medication as prescribed: yes    Side Effects:  None    Medication Helping Symptoms:  yes     Intermittent asthma, uncomplicated: Patient says she uses albuterol PRN intermittently.     Problem list and histories reviewed & adjusted, as indicated.  Additional history: none    Reviewed and updated as needed this visit by clinical staffTobacco  Allergies  Meds  Med Hx  Surg Hx  Fam Hx  Soc Hx       Past Medical History:   Diagnosis Date     ADD (attention deficit disorder)     ADD +      Anxiety 11/26/2012    2x week- 3x week     Attention deficit disorder (ADD) without hyperactivity 6/29/2017     Body dysmorphic disorder 11/11/2015     Controlled substance agreement signed 4/23/2016    Corey Adderall XL 30 Adderall 20 #30 Ea q mo      Elevated blood pressure reading without diagnosis of hypertension 4/23/2016     Headache(784.0) 1994    MRI negative     Intermittent asthma     seasonal + using albuterol        Past Surgical History:   Procedure Laterality Date     HC ENLARGE BREAST WITH IMPLANT  10/2005    Dr. Helton       Family History   Problem Relation Age of Onset     CANCER Father        Social History   Substance Use Topics     Smoking status: Never Smoker     Smokeless tobacco: Never Used     Alcohol use Yes      Comment: social       Reviewed and updated as needed this visit by Provider         ROS:    This document serves as a record of the services and decisions personally performed and made by Zak Nicole MD. It was created on his behalf by Clair Flores, a trained medical scribe.  The creation of this document is based on the scribe's personal observations and the provider's statements to the medical scribe.  Clair Flores, September 21, 2017  10:31 AM    OBJECTIVE:     There were no vitals taken for this visit.  There is no height or weight on file to calculate BMI.    GEN: Healthy, alert, no distress  ENT: Ears are without cerumen, TMs clear  EYES: Pupils are symmetric   MOUTH: moist mucous membranes  NECK: Supple without nodes or thyromegaly   HEART: S1S1 RRR without apical displacement  CHEST: Lungs clear to auscultation, no labored breathing  ABDOMEN: Without organomegaly nor tenderness to palpation  MS: No edema or brawny changes.     ASSESSMENT/PLAN:   ASSESSMENT / PLAN:  (F98.8) Attention deficit disorder (ADD) without hyperactivity  Comment:   Plan: amphetamine-dextroamphetamine (ADDERALL XR) 30         MG per 24 hr capsule,         amphetamine-dextroamphetamine (ADDERALL) 20 MG         per tablet, DISCONTINUED:         amphetamine-dextroamphetamine (ADDERALL) 20 MG         per tablet, DISCONTINUED:         amphetamine-dextroamphetamine (ADDERALL XR) 30         MG per 24 hr capsule, DISCONTINUED:         amphetamine-dextroamphetamine (ADDERALL) 20 MG         per tablet, DISCONTINUED:         amphetamine-dextroamphetamine (ADDERALL XR) 30         MG per 24 hr capsule, DISCONTINUED:         amphetamine-dextroamphetamine (ADDERALL XR) 30         MG per 24 hr capsule, DISCONTINUED:         amphetamine-dextroamphetamine (ADDERALL) 20 MG         per tablet            (J45.20) Intermittent asthma, uncomplicated  Comment:   Plan: albuterol (PROAIR HFA/PROVENTIL HFA/VENTOLIN         HFA) 108 (90 BASE) MCG/ACT Inhaler          RTC The information in this document, created by the medical scribe for me, accurately reflects the services I personally performed and the decisions made by me. I have reviewed and approved this document for accuracy prior to leaving the patient care area.  Zak Nicole MD September 21, 2017 10:31 AM    Zak Nicole MD  [facility]

## 2017-09-21 NOTE — NURSING NOTE
"Chief Complaint   Patient presents with     Attention Deficit Disorder     Recheck Medication     Adderall       Initial There were no vitals taken for this visit. Estimated body mass index is 20.79 kg/(m^2) as calculated from the following:    Height as of 2/9/17: 5' 9\" (1.753 m).    Weight as of 2/9/17: 140 lb 12.8 oz (63.9 kg).  Medication Reconciliation: complete left arm Penelope Chowdhury MA          "

## 2017-09-25 ENCOUNTER — TELEPHONE (OUTPATIENT)
Dept: FAMILY MEDICINE | Facility: CLINIC | Age: 37
End: 2017-09-25

## 2017-09-25 NOTE — TELEPHONE ENCOUNTER
L/M to call.  Pham Marie, RN      Call pt   Inform her she needs lab (futured) she can do it at any [place] lab. We will not fill her medications if she does not do so before the month is out, as per her controlled substance contract   Zak Nicole                4. Controlled substance agreement signed  Pt leaves without:  - Drug  Screen Comprehensive, Urine w/o Reported Meds (Pain Care Package); Future

## 2017-09-25 NOTE — LETTER
[facility]  [address] [place], [place], [address]  [phone]        September 28, 2017    Delicia Zhou                                                                                                                                   [address] [place] [address]            Dear Delicia,      I have been trying to reach you by phone and been unsuccessful.  See Dr. Nicole's message below.  Also time to update asthma questions.  You can drop off at  when you come for lab.  Both need to be done before refills can be done for any further medications.     Call pt   Inform her she needs lab (futured) she can do it at any [place] lab. We will not fill her medications if she does not do so before the month is out, as per her controlled substance contract   Zak Nicole     Thanks,      [facility]

## 2017-09-27 NOTE — TELEPHONE ENCOUNTER
Can you contact this patient and see how their asthma is doing?  Please go through the ACT and document OR send them the survey  thanks

## 2017-10-19 ENCOUNTER — TELEPHONE (OUTPATIENT)
Dept: FAMILY MEDICINE | Facility: CLINIC | Age: 37
End: 2017-10-19

## 2017-10-19 NOTE — TELEPHONE ENCOUNTER
Panel Management Review      Patient has the following on her problem list:     Asthma review     ACT Total Scores 2/9/2017   ACT TOTAL SCORE -   ASTHMA ER VISITS -   ASTHMA HOSPITALIZATIONS -   ACT TOTAL SCORE (Goal Greater than or Equal to 20) 25   In the past 12 months, how many times did you visit the emergency room for your asthma without being admitted to the hospital? 0   In the past 12 months, how many times were you hospitalized overnight because of your asthma? 0      1. Is Asthma diagnosis on the Problem List? Yes    2. Is Asthma listed on Health Maintenance? Yes    3. Patient is due for:  ACT and AAP        Composite cancer screening  Chart review shows that this patient is due/due soon for the following   Summary:    Patient is due/failing the follow: Diabetes     Action needed:   Patient needs office visit for Diabetes .    Type of outreach:        Questions for provider review:    None                                                                                                                                    Penelope Chowdhury MA       Chart routed to Care Team [id number]

## 2017-10-23 NOTE — MR AVS SNAPSHOT
After Visit Summary   9/21/2017    Delicia Zhou    MRN: [id number]           Patient Information     Date Of Birth          1980        Visit Information        Provider Department      9/21/2017 8:45 AM Zak Nicole MD [facility]        Today's Diagnoses     Routine general medical examination at a health care facility    -  1    Attention deficit disorder (ADD) without hyperactivity        Intermittent asthma, uncomplicated        Controlled substance agreement signed          Care Instructions      Preventive Health Recommendations  Female Ages 26 - 39  Yearly exam:   See your health care provider every year in order to    Review health changes.     Discuss preventive care.      Review your medicines if you your doctor has prescribed any.    Until age 30: Get a Pap test every three years (more often if you have had an abnormal result).    After age 30: Talk to your doctor about whether you should have a Pap test every 3 years or have a Pap test with HPV screening every 5 years.   You do not need a Pap test if your uterus was removed (hysterectomy) and you have not had cancer.  You should be tested each year for STDs (sexually transmitted diseases), if you're at risk.   Talk to your provider about how often to have your cholesterol checked.  If you are at risk for diabetes, you should have a diabetes test (fasting glucose).  Shots: Get a flu shot each year. Get a tetanus shot every 10 years.   Nutrition:     Eat at least 5 servings of fruits and vegetables each day.    Eat whole-grain bread, whole-wheat pasta and brown rice instead of white grains and rice.    Talk to your provider about Calcium and Vitamin D.     Lifestyle    Exercise at least 150 minutes a week (30 minutes a day, 5 days of the week). This will help you control your weight and prevent disease.    Limit alcohol to one drink per day.    No smoking.     Wear sunscreen to prevent skin cancer.    See your  dentist every six months for an exam and cleaning.            Follow-ups after your visit        Who to contact     If you have questions or need follow up information about today's clinic visit or your schedule please contact [facility] directly at [phone].  Normal or non-critical lab and imaging results will be communicated to you by MyChart, letter or phone within 4 business days after the clinic has received the results. If you do not hear from us within 7 days, please contact the clinic through [employer] or phone. If you have a critical or abnormal lab result, we will notify you by phone as soon as possible.  Submit refill requests through [email or web address] or call your pharmacy and they will forward the refill request to us. Please allow 3 business days for your refill to be completed.          Additional Information About Your Visit        [employer] Information     [email or web address] gives you secure access to your electronic health record. If you see a primary care provider, you can also send messages to your care team and make appointments. If you have questions, please call your primary care clinic.  If you do not have a primary care provider, please call [phone] and they will assist you.        Care EveryWhere ID     This is your Care EveryWhere ID. This could be used by other organizations to access your [place] medical records  [id number]         Blood Pressure from Last 3 Encounters:   02/09/17 137/84   01/05/17 123/74   11/03/16 114/66    Weight from Last 3 Encounters:   02/09/17 140 lb 12.8 oz (63.9 kg)   01/05/17 145 lb (65.8 kg)   11/03/16 153 lb (69.4 kg)                 Today's Medication Changes          These changes are accurate as of: 9/21/17 11:59 PM.  If you have any questions, ask your nurse or doctor.               Start taking these medicines.        Dose/Directions    * amphetamine-dextroamphetamine 20 MG per tablet   Commonly known as:  ADDERALL   Used for:  Attention deficit  disorder (ADD) without hyperactivity   Started by:  Zak Nicole MD        Dose:  20 mg   Take 1 tablet (20 mg) by mouth daily   Quantity:  30 tablet   Refills:  0       * amphetamine-dextroamphetamine 30 MG per 24 hr capsule   Commonly known as:  ADDERALL XR   Used for:  Attention deficit disorder (ADD) without hyperactivity   Started by:  Zak Nicole MD        Dose:  30 mg   Take 1 capsule (30 mg) by mouth daily   Quantity:  30 capsule   Refills:  0       * amphetamine-dextroamphetamine 30 MG per 24 hr capsule   Commonly known as:  ADDERALL XR   Used for:  Attention deficit disorder (ADD) without hyperactivity   Started by:  Zak Nicole MD        Dose:  30 mg   Start taking on:  10/21/2017   Take 1 capsule (30 mg) by mouth daily   Quantity:  30 capsule   Refills:  0       * amphetamine-dextroamphetamine 20 MG per tablet   Commonly known as:  ADDERALL   Used for:  Attention deficit disorder (ADD) without hyperactivity   Started by:  Zak Nicole MD        Dose:  20 mg   Start taking on:  10/21/2017   Take 1 tablet (20 mg) by mouth daily   Quantity:  30 tablet   Refills:  0       * amphetamine-dextroamphetamine 20 MG per tablet   Commonly known as:  ADDERALL   Used for:  Attention deficit disorder (ADD) without hyperactivity   Started by:  Zak Nicole MD        Dose:  20 mg   Start taking on:  11/20/2017   Take 1 tablet (20 mg) by mouth daily   Quantity:  30 tablet   Refills:  0       * amphetamine-dextroamphetamine 30 MG per 24 hr capsule   Commonly known as:  ADDERALL XR   Used for:  Attention deficit disorder (ADD) without hyperactivity   Started by:  Zak Nicole MD        Dose:  30 mg   Start taking on:  11/20/2017   Take 1 capsule (30 mg) by mouth daily   Quantity:  30 capsule   Refills:  0       * Notice:  This list has 6 medication(s) that are the same as other medications prescribed for you. Read the directions carefully, and ask your doctor or other care provider to review them with  you.      Stop taking these medicines if you haven't already. Please contact your care team if you have questions.     buPROPion 150 MG 24 hr tablet   Commonly known as:  WELLBUTRIN XL   Stopped by:  Zak Nicole MD                Where to get your medicines      These medications were sent to [id number] [address] [place] [address] AT [facility] [address]  [address] [address], [facility] [address]     Phone:  [phone]     albuterol 108 (90 BASE) MCG/ACT Inhaler         Some of these will need a paper prescription and others can be bought over the counter.  Ask your nurse if you have questions.     Bring a paper prescription for each of these medications     amphetamine-dextroamphetamine 20 MG per tablet    amphetamine-dextroamphetamine 20 MG per tablet    amphetamine-dextroamphetamine 20 MG per tablet    amphetamine-dextroamphetamine 30 MG per 24 hr capsule    amphetamine-dextroamphetamine 30 MG per 24 hr capsule    amphetamine-dextroamphetamine 30 MG per 24 hr capsule                Primary Care Provider Office Phone # Fax #    Zak Nicole [phone] [address] [facility] [address]        Equal Access to Services     HATTIE [facility] AH: Hadii aad ku hadasho Soomaali, waaxda luqadaha, qaybta kaalmada adeegyada, waxay idiin hayaan adeeg kharamorgan laelier ah. So [facility] [phone].    ATENCIÓN: Si habla español, tiene a mtz disposición servicios grathldos de asistencia lingüística. Sharlene al [phone].    We comply with applicable federal civil rights laws and [place] laws. We do not discriminate on the basis of race, color, national origin, age, disability sex, sexual orientation or gender identity.            Thank you!     Thank you for choosing [facility]  for your care. Our goal is always to provide you with excellent care. Hearing back from our patients is one way we can continue to improve our services. Please take a few  minutes to complete the written survey that you may receive in the mail after your visit with us. Thank you!             Your Updated Medication List - Protect others around you: Learn how to safely use, store and throw away your medicines at www.disposemymeds.org.          This list is accurate as of: 9/21/17 11:59 PM.  Always use your most recent med list.                   Brand Name Dispense Instructions for use Diagnosis    albuterol 108 (90 BASE) MCG/ACT Inhaler    PROAIR HFA/PROVENTIL HFA/VENTOLIN HFA    1 Inhaler    Inhale 2 puffs into the lungs every 6 hours as needed for shortness of breath / dyspnea    Intermittent asthma, uncomplicated       * amphetamine-dextroamphetamine 20 MG per tablet    ADDERALL    30 tablet    Take 1 tablet (20 mg) by mouth daily    Attention deficit disorder (ADD) without hyperactivity       * amphetamine-dextroamphetamine 30 MG per 24 hr capsule    ADDERALL XR    30 capsule    Take 1 capsule (30 mg) by mouth daily    Attention deficit disorder (ADD) without hyperactivity       * amphetamine-dextroamphetamine 30 MG per 24 hr capsule   Start taking on:  10/21/2017    ADDERALL XR    30 capsule    Take 1 capsule (30 mg) by mouth daily    Attention deficit disorder (ADD) without hyperactivity       * amphetamine-dextroamphetamine 20 MG per tablet   Start taking on:  10/21/2017    ADDERALL    30 tablet    Take 1 tablet (20 mg) by mouth daily    Attention deficit disorder (ADD) without hyperactivity       * amphetamine-dextroamphetamine 20 MG per tablet   Start taking on:  11/20/2017    ADDERALL    30 tablet    Take 1 tablet (20 mg) by mouth daily    Attention deficit disorder (ADD) without hyperactivity       * amphetamine-dextroamphetamine 30 MG per 24 hr capsule   Start taking on:  11/20/2017    ADDERALL XR    30 capsule    Take 1 capsule (30 mg) by mouth daily    Attention deficit disorder (ADD) without hyperactivity       cetirizine 10 MG tablet    zyrTEC    90 tablet    Take 1  tablet (10 mg) by mouth every evening    Allergic state, subsequent encounter       * Notice:  This list has 6 medication(s) that are the same as other medications prescribed for you. Read the directions carefully, and ask your doctor or other care provider to review them with you.       right upper extremity findings

## 2017-12-13 DIAGNOSIS — F98.8 ATTENTION DEFICIT DISORDER (ADD) WITHOUT HYPERACTIVITY: ICD-10-CM

## 2017-12-13 NOTE — TELEPHONE ENCOUNTER
Controlled Substance Refill Request for Adderall 20 Mg, Adderall XR 30 Mg per 24 hr capsules  Problem List Complete:  Yes     checked in past 6 months?  Yes 09/21/2017     Intermittent asthma   CARDIOVASCULAR SCREENING; LDL GOAL LESS THAN 160   Anxiety   Body dysmorphic disorder   Controlled substance agreement signed   Elevated blood pressure reading without diagnosis of hypertension   Attention deficit disorder (ADD) without hyperactivity     Please call patient at [phone] when paper prescription is available for  at the [facility].    Mirian Ramirez/

## 2017-12-14 RX ORDER — DEXTROAMPHETAMINE SACCHARATE, AMPHETAMINE ASPARTATE MONOHYDRATE, DEXTROAMPHETAMINE SULFATE AND AMPHETAMINE SULFATE 7.5; 7.5; 7.5; 7.5 MG/1; MG/1; MG/1; MG/1
30 CAPSULE, EXTENDED RELEASE ORAL DAILY
Qty: 30 CAPSULE | Refills: 0 | Status: SHIPPED | OUTPATIENT
Start: 2018-02-12 | End: 2018-03-13

## 2017-12-14 RX ORDER — DEXTROAMPHETAMINE SACCHARATE, AMPHETAMINE ASPARTATE, DEXTROAMPHETAMINE SULFATE AND AMPHETAMINE SULFATE 5; 5; 5; 5 MG/1; MG/1; MG/1; MG/1
20 TABLET ORAL DAILY
Qty: 30 TABLET | Refills: 0 | Status: SHIPPED | OUTPATIENT
Start: 2017-12-14 | End: 2017-12-14

## 2017-12-14 RX ORDER — DEXTROAMPHETAMINE SACCHARATE, AMPHETAMINE ASPARTATE, DEXTROAMPHETAMINE SULFATE AND AMPHETAMINE SULFATE 5; 5; 5; 5 MG/1; MG/1; MG/1; MG/1
20 TABLET ORAL DAILY
Qty: 30 TABLET | Refills: 0 | Status: SHIPPED | OUTPATIENT
Start: 2018-01-13 | End: 2018-03-13

## 2017-12-14 RX ORDER — DEXTROAMPHETAMINE SACCHARATE, AMPHETAMINE ASPARTATE, DEXTROAMPHETAMINE SULFATE AND AMPHETAMINE SULFATE 5; 5; 5; 5 MG/1; MG/1; MG/1; MG/1
20 TABLET ORAL DAILY
Qty: 30 TABLET | Refills: 0 | Status: SHIPPED | OUTPATIENT
Start: 2018-02-12 | End: 2018-03-13

## 2017-12-14 RX ORDER — DEXTROAMPHETAMINE SACCHARATE, AMPHETAMINE ASPARTATE MONOHYDRATE, DEXTROAMPHETAMINE SULFATE AND AMPHETAMINE SULFATE 7.5; 7.5; 7.5; 7.5 MG/1; MG/1; MG/1; MG/1
30 CAPSULE, EXTENDED RELEASE ORAL DAILY
Qty: 30 CAPSULE | Refills: 0 | Status: SHIPPED | OUTPATIENT
Start: 2018-01-13 | End: 2018-03-13

## 2017-12-14 RX ORDER — DEXTROAMPHETAMINE SACCHARATE, AMPHETAMINE ASPARTATE MONOHYDRATE, DEXTROAMPHETAMINE SULFATE AND AMPHETAMINE SULFATE 7.5; 7.5; 7.5; 7.5 MG/1; MG/1; MG/1; MG/1
30 CAPSULE, EXTENDED RELEASE ORAL DAILY
Qty: 30 CAPSULE | Refills: 0 | Status: SHIPPED | OUTPATIENT
Start: 2017-12-14 | End: 2017-12-14

## 2017-12-14 NOTE — TELEPHONE ENCOUNTER
Tried calling patient, unable to leave msg, voice mail box is full. ADDERALL Rx's are up front, ready for . Ruth Behrens.

## 2018-01-18 ENCOUNTER — TELEPHONE (OUTPATIENT)
Dept: FAMILY MEDICINE | Facility: CLINIC | Age: 38
End: 2018-01-18

## 2018-01-18 NOTE — TELEPHONE ENCOUNTER
Panel Management Review      Patient has the following on her problem list:     Asthma review     ACT Total Scores 2/9/2017   ACT TOTAL SCORE -   ASTHMA ER VISITS -   ASTHMA HOSPITALIZATIONS -   ACT TOTAL SCORE (Goal Greater than or Equal to 20) 25   In the past 12 months, how many times did you visit the emergency room for your asthma without being admitted to the hospital? 0   In the past 12 months, how many times were you hospitalized overnight because of your asthma? 0      1. Is Asthma diagnosis on the Problem List? Yes    2. Is Asthma listed on Health Maintenance? Yes    3. Patient is due for:  ACT        Composite cancer screening  Chart review shows that this patient is due/due soon for the following Pap Smear  Summary:    Patient is due/failing the following:   ACT and PAP    Action needed:   Patient needs to do ACT. and Patient needs referral/order: PAP    Type of outreach:    Spoke to patient, she goes to [facility]     Questions for provider review:    None                                                                                                                                    Penelope Chowdhury MA       Chart routed to Care Team .

## 2018-02-08 ENCOUNTER — TELEPHONE (OUTPATIENT)
Dept: FAMILY MEDICINE | Facility: CLINIC | Age: 38
End: 2018-02-08

## 2018-02-08 ENCOUNTER — MYC MEDICAL ADVICE (OUTPATIENT)
Dept: FAMILY MEDICINE | Facility: CLINIC | Age: 38
End: 2018-02-08

## 2018-02-08 DIAGNOSIS — F33.0 MILD RECURRENT MAJOR DEPRESSION (H): Primary | ICD-10-CM

## 2018-02-08 RX ORDER — BUPROPION HYDROCHLORIDE 150 MG/1
150 TABLET ORAL EVERY MORNING
Qty: 30 TABLET | Refills: 1 | Status: SHIPPED | OUTPATIENT
Start: 2018-02-08 | End: 2018-03-13

## 2018-02-08 NOTE — TELEPHONE ENCOUNTER
Pt calls, informs wants wellbutrin prescribed again for depression and anxiety, pt had short term supply 1/2017, explained needs appointment to update information since over one year old, pt not happy with this, wants BW to advise, does not want to make appointment, aware due March for 6 month visit for adderall, routed to , please advise plan, inform pt when final    Kiara Shrestha RN, BSN  Message handled by Nurse Triage.

## 2018-02-08 NOTE — TELEPHONE ENCOUNTER
Ph. [phone]    Patient calling stating would like a refill.      RT    Patient states she has not had a fill of this medication for a long time and I wanted to verify if it was venlafaxine (effexor)  or Alprazolam( Xanax) from 2014    Shruthi Moeller

## 2018-03-03 ENCOUNTER — HEALTH MAINTENANCE LETTER (OUTPATIENT)
Age: 38
End: 2018-03-03

## 2018-03-05 ENCOUNTER — TELEPHONE (OUTPATIENT)
Dept: FAMILY MEDICINE | Facility: CLINIC | Age: 38
End: 2018-03-05

## 2018-03-05 DIAGNOSIS — F98.8 ATTENTION DEFICIT DISORDER (ADD) WITHOUT HYPERACTIVITY: ICD-10-CM

## 2018-03-05 NOTE — TELEPHONE ENCOUNTER
Controlled Substance Refill Request for amphetamine-dextroamphetamine (ADDERALL XR) 30 MG per 24 hr capsule  Problem List Complete:  No     PROVIDER TO CONSIDER COMPLETION OF PROBLEM LIST AND OVERVIEW/CONTROLLED SUBSTANCE AGREEMENT    Last Written Prescription Date:  2/12/2018  Last Fill Quantity: 30 capsule,   # refills: 0    Last Office Visit with [facility] primary care provider: 9/21/2017Corey    Controlled substance agreement on file: No.     Processing:  Staff will hand deliver Rx to on-site pharmacy     checked in past 6 months?  No, route to RN

## 2018-03-05 NOTE — TELEPHONE ENCOUNTER
PA needed for D-Amphetamine Salt Com ER (XR) 30 mg    Plan [phone]  ID-[id number]    Pham Marie RN

## 2018-03-06 PROBLEM — F98.8 ATTENTION DEFICIT DISORDER (ADD) WITHOUT HYPERACTIVITY: Status: ACTIVE | Noted: 2017-06-29

## 2018-03-06 RX ORDER — DEXTROAMPHETAMINE SACCHARATE, AMPHETAMINE ASPARTATE MONOHYDRATE, DEXTROAMPHETAMINE SULFATE AND AMPHETAMINE SULFATE 7.5; 7.5; 7.5; 7.5 MG/1; MG/1; MG/1; MG/1
30 CAPSULE, EXTENDED RELEASE ORAL DAILY
Qty: 30 CAPSULE | Refills: 0 | Status: SHIPPED | OUTPATIENT
Start: 2018-03-06 | End: 2018-03-13

## 2018-03-06 NOTE — TELEPHONE ENCOUNTER
Overview completed and  completed.  No concerns on .  9/21/17 note states left without drug screen.  Still has not completed.  Visit due 3/21/18.  Please advise.  Route back if OK to wait for drug screen for visit.  I have not sent letter yet.  Pham Marie RN         9/21/17  ASSESSMENT/PLAN:   1. Routine general medical examination at a health care facility     - Lipid Profile (Chol, Trig, HDL, LDL calc); Future     2. Attention deficit disorder (ADD) without hyperactivity  3 mos Rx  - amphetamine-dextroamphetamine (ADDERALL) 20 MG per tablet; Take 1 tablet (20 mg) by mouth daily  Dispense: 30 tablet; Refill: 0  - amphetamine-dextroamphetamine (ADDERALL XR) 30 MG per 24 hr capsule; Take 1 capsule (30 mg) by mouth daily  Dispense: 30 capsule; Refill: 0  - amphetamine-dextroamphetamine (ADDERALL) 20 MG per tablet; Take 1 tablet (20 mg) by mouth daily  Dispense: 30 tablet; Refill: 0  - amphetamine-dextroamphetamine (ADDERALL XR) 30 MG per 24 hr capsule; Take 1 capsule (30 mg) by mouth daily  Dispense: 30 capsule; Refill: 0  - amphetamine-dextroamphetamine (ADDERALL XR) 30 MG per 24 hr capsule; Take 1 capsule (30 mg) by mouth daily  Dispense: 30 capsule; Refill: 0  - amphetamine-dextroamphetamine (ADDERALL) 20 MG per tablet; Take 1 tablet (20 mg) by mouth daily  Dispense: 30 tablet; Refill: 0     3. Intermittent asthma, uncomplicated  refill  - albuterol (PROAIR HFA/PROVENTIL HFA/VENTOLIN HFA) 108 (90 BASE) MCG/ACT Inhaler; Inhale 2 puffs into the lungs every 6 hours as needed for shortness of breath / dyspnea  Dispense: 1 Inhaler; Refill: 6     4. Controlled substance agreement signed  Pt leaves without:  - Drug  Screen Comprehensive, Urine w/o Reported Meds (Pain Care Package); Future

## 2018-03-13 ENCOUNTER — OFFICE VISIT (OUTPATIENT)
Dept: FAMILY MEDICINE | Facility: CLINIC | Age: 38
End: 2018-03-13
Payer: COMMERCIAL

## 2018-03-13 VITALS
BODY MASS INDEX: 21.67 KG/M2 | SYSTOLIC BLOOD PRESSURE: 120 MMHG | RESPIRATION RATE: 14 BRPM | TEMPERATURE: 98.1 F | WEIGHT: 143 LBS | HEIGHT: 68 IN | HEART RATE: 106 BPM | DIASTOLIC BLOOD PRESSURE: 86 MMHG

## 2018-03-13 DIAGNOSIS — S01.81XA LACERATION OF FOREHEAD, INITIAL ENCOUNTER: ICD-10-CM

## 2018-03-13 DIAGNOSIS — Z12.4 SCREENING FOR MALIGNANT NEOPLASM OF CERVIX: ICD-10-CM

## 2018-03-13 DIAGNOSIS — Z00.00 ROUTINE GENERAL MEDICAL EXAMINATION AT A HEALTH CARE FACILITY: Primary | ICD-10-CM

## 2018-03-13 DIAGNOSIS — J45.20 INTERMITTENT ASTHMA, UNCOMPLICATED: ICD-10-CM

## 2018-03-13 DIAGNOSIS — Z91.148 CONTROLLED SUBSTANCE AGREEMENT BROKEN: ICD-10-CM

## 2018-03-13 DIAGNOSIS — F98.8 ATTENTION DEFICIT DISORDER (ADD) WITHOUT HYPERACTIVITY: ICD-10-CM

## 2018-03-13 DIAGNOSIS — F32.5 MAJOR DEPRESSION IN COMPLETE REMISSION (H): ICD-10-CM

## 2018-03-13 PROCEDURE — 99395 PREV VISIT EST AGE 18-39: CPT | Performed by: FAMILY MEDICINE

## 2018-03-13 PROCEDURE — 80307 DRUG TEST PRSMV CHEM ANLYZR: CPT | Mod: 90 | Performed by: FAMILY MEDICINE

## 2018-03-13 PROCEDURE — 99000 SPECIMEN HANDLING OFFICE-LAB: CPT | Performed by: FAMILY MEDICINE

## 2018-03-13 RX ORDER — DEXTROAMPHETAMINE SACCHARATE, AMPHETAMINE ASPARTATE, DEXTROAMPHETAMINE SULFATE AND AMPHETAMINE SULFATE 7.5; 7.5; 7.5; 7.5 MG/1; MG/1; MG/1; MG/1
30 TABLET ORAL DAILY
Qty: 32 TABLET | Refills: 0 | Status: SHIPPED | OUTPATIENT
Start: 2018-05-09 | End: 2019-01-19

## 2018-03-13 RX ORDER — DEXTROAMPHETAMINE SACCHARATE, AMPHETAMINE ASPARTATE, DEXTROAMPHETAMINE SULFATE AND AMPHETAMINE SULFATE 7.5; 7.5; 7.5; 7.5 MG/1; MG/1; MG/1; MG/1
30 TABLET ORAL DAILY
Qty: 30 TABLET | Refills: 0 | Status: SHIPPED | OUTPATIENT
Start: 2018-04-13 | End: 2018-03-13

## 2018-03-13 RX ORDER — BUPROPION HYDROCHLORIDE 150 MG/1
150 TABLET ORAL EVERY MORNING
Qty: 90 TABLET | Refills: 1 | Status: SHIPPED | OUTPATIENT
Start: 2018-03-13 | End: 2019-02-15

## 2018-03-13 RX ORDER — DEXTROAMPHETAMINE SACCHARATE, AMPHETAMINE ASPARTATE, DEXTROAMPHETAMINE SULFATE AND AMPHETAMINE SULFATE 7.5; 7.5; 7.5; 7.5 MG/1; MG/1; MG/1; MG/1
30 TABLET ORAL DAILY
Qty: 30 TABLET | Refills: 0 | Status: SHIPPED | OUTPATIENT
Start: 2018-05-11 | End: 2019-01-19

## 2018-03-13 RX ORDER — DEXTROAMPHETAMINE SACCHARATE, AMPHETAMINE ASPARTATE, DEXTROAMPHETAMINE SULFATE AND AMPHETAMINE SULFATE 7.5; 7.5; 7.5; 7.5 MG/1; MG/1; MG/1; MG/1
30 TABLET ORAL DAILY
Qty: 30 TABLET | Refills: 0 | Status: SHIPPED | OUTPATIENT
Start: 2018-03-14 | End: 2018-03-13

## 2018-03-13 RX ORDER — DEXTROAMPHETAMINE SACCHARATE, AMPHETAMINE ASPARTATE MONOHYDRATE, DEXTROAMPHETAMINE SULFATE AND AMPHETAMINE SULFATE 7.5; 7.5; 7.5; 7.5 MG/1; MG/1; MG/1; MG/1
30 CAPSULE, EXTENDED RELEASE ORAL DAILY
Qty: 32 CAPSULE | Refills: 0 | Status: SHIPPED | OUTPATIENT
Start: 2018-05-09 | End: 2019-01-19

## 2018-03-13 RX ORDER — DEXTROAMPHETAMINE SACCHARATE, AMPHETAMINE ASPARTATE MONOHYDRATE, DEXTROAMPHETAMINE SULFATE AND AMPHETAMINE SULFATE 7.5; 7.5; 7.5; 7.5 MG/1; MG/1; MG/1; MG/1
30 CAPSULE, EXTENDED RELEASE ORAL DAILY
Qty: 34 CAPSULE | Refills: 0 | Status: SHIPPED | OUTPATIENT
Start: 2018-04-05 | End: 2019-01-19

## 2018-03-13 RX ORDER — DEXTROAMPHETAMINE SACCHARATE, AMPHETAMINE ASPARTATE, DEXTROAMPHETAMINE SULFATE AND AMPHETAMINE SULFATE 7.5; 7.5; 7.5; 7.5 MG/1; MG/1; MG/1; MG/1
30 TABLET ORAL DAILY
Qty: 30 TABLET | Refills: 0 | Status: CANCELLED | OUTPATIENT
Start: 2018-03-14

## 2018-03-13 RX ORDER — DEXTROAMPHETAMINE SACCHARATE, AMPHETAMINE ASPARTATE, DEXTROAMPHETAMINE SULFATE AND AMPHETAMINE SULFATE 7.5; 7.5; 7.5; 7.5 MG/1; MG/1; MG/1; MG/1
30 TABLET ORAL DAILY
Qty: 30 TABLET | Refills: 0 | Status: SHIPPED | OUTPATIENT
Start: 2018-05-11 | End: 2018-03-13

## 2018-03-13 RX ORDER — DEXTROAMPHETAMINE SACCHARATE, AMPHETAMINE ASPARTATE MONOHYDRATE, DEXTROAMPHETAMINE SULFATE AND AMPHETAMINE SULFATE 7.5; 7.5; 7.5; 7.5 MG/1; MG/1; MG/1; MG/1
30 CAPSULE, EXTENDED RELEASE ORAL DAILY
Qty: 34 CAPSULE | Refills: 0 | Status: SHIPPED | OUTPATIENT
Start: 2018-04-13 | End: 2018-03-13

## 2018-03-13 RX ORDER — ALBUTEROL SULFATE 90 UG/1
2 AEROSOL, METERED RESPIRATORY (INHALATION) EVERY 6 HOURS PRN
Qty: 1 INHALER | Refills: 6 | Status: SHIPPED | OUTPATIENT
Start: 2018-03-13

## 2018-03-13 RX ORDER — DEXTROAMPHETAMINE SACCHARATE, AMPHETAMINE ASPARTATE, DEXTROAMPHETAMINE SULFATE AND AMPHETAMINE SULFATE 7.5; 7.5; 7.5; 7.5 MG/1; MG/1; MG/1; MG/1
30 TABLET ORAL DAILY
Qty: 30 TABLET | Refills: 0 | Status: SHIPPED | OUTPATIENT
Start: 2018-03-14

## 2018-03-13 ASSESSMENT — ANXIETY QUESTIONNAIRES
5. BEING SO RESTLESS THAT IT IS HARD TO SIT STILL: NOT AT ALL
GAD7 TOTAL SCORE: 5
IF YOU CHECKED OFF ANY PROBLEMS ON THIS QUESTIONNAIRE, HOW DIFFICULT HAVE THESE PROBLEMS MADE IT FOR YOU TO DO YOUR WORK, TAKE CARE OF THINGS AT HOME, OR GET ALONG WITH OTHER PEOPLE: SOMEWHAT DIFFICULT
7. FEELING AFRAID AS IF SOMETHING AWFUL MIGHT HAPPEN: NOT AT ALL
2. NOT BEING ABLE TO STOP OR CONTROL WORRYING: SEVERAL DAYS
1. FEELING NERVOUS, ANXIOUS, OR ON EDGE: SEVERAL DAYS
3. WORRYING TOO MUCH ABOUT DIFFERENT THINGS: SEVERAL DAYS
6. BECOMING EASILY ANNOYED OR IRRITABLE: SEVERAL DAYS

## 2018-03-13 ASSESSMENT — PATIENT HEALTH QUESTIONNAIRE - PHQ9: 5. POOR APPETITE OR OVEREATING: SEVERAL DAYS

## 2018-03-13 NOTE — PROGRESS NOTES
SUBJECTIVE:   CC: Delicia Zhou is an 37 year old woman who presents for preventive health visit.   Routine general medical examination at a health care facility  (primary encounter diagnosis)  Physical   Annual:     Getting at least 3 servings of Calcium per day::  Yes    Bi-annual eye exam::  Yes    Dental care twice a year::  Yes    Sleep apnea or symptoms of sleep apnea::  None    Diet::  Regular (no restrictions)    Frequency of exercise::  1 day/week    Duration of exercise::  30-45 minutes    Taking medications regularly::  Yes    Medication side effects::  None    Additional concerns today::  No              Screening for malignant neoplasm of cervix: Patient last received pap 9 months ago from [facility]    Mild recurrent major depression (H)  PHQ-9 SCORE 12/27/2010 9/26/2014   Total Score 2 3         Intermittent asthma, uncomplicated: ACT was 23     Attention deficit disorder (ADD) without hyperactivity: Patient wishes to get afternoon dose of Adderall increased from 20 mg to 30. She admits to self dosing 30 mg in afternoon by cutting 20 mg pills in half.  See additional note    Patient hit her forehead on a dresser. She says she when she tries to clean the wound it bleeds profusely.      Today's PHQ-2 Score:   PHQ-2 ( 1999 Pfizer) 3/13/2018   Q1: Little interest or pleasure in doing things 1   Q2: Feeling down, depressed or hopeless 1   PHQ-2 Score 2   Q1: Little interest or pleasure in doing things Several days   Q2: Feeling down, depressed or hopeless Several days   PHQ-2 Score 2       Abuse: Current or Past(Physical, Sexual or Emotional)- No  Do you feel safe in your environment - Yes    Social History   Substance Use Topics     Smoking status: Never Smoker     Smokeless tobacco: Never Used     Alcohol use Yes      Comment: social     No flowsheet data found.    Reviewed orders with patient.  Reviewed health maintenance and updated orders accordingly - Yes      Mammogram not appropriate for this  patient based on age.    Pertinent mammograms are reviewed under the imaging tab.  History of abnormal Pap smear: NO - age 30- 65 PAP every 3 years recommended records requested    Reviewed and updated as needed this visit by clinical staff         Reviewed and updated as needed this visit by Provider        Past Medical History:   Diagnosis Date     ADD (attention deficit disorder)     ADD +      Anxiety 11/26/2012    2x week- 3x week     Attention deficit disorder (ADD) without hyperactivity 6/29/2017     Body dysmorphic disorder 11/11/2015     Controlled substance agreement signed 4/23/2016    [facility]y Adderall XL 30 Adderall 20 #30 Ea q mo      Elevated blood pressure reading without diagnosis of hypertension 4/23/2016     Headache(784.0) 1994    MRI negative     Intermittent asthma     seasonal + using albuterol      Mild recurrent major depression (H) 2/8/2018       Past Surgical History:   Procedure Laterality Date     HC ENLARGE BREAST WITH IMPLANT  10/2005    Dr. Helton       Family History   Problem Relation Age of Onset     CANCER Father        Social History   Substance Use Topics     Smoking status: Never Smoker     Smokeless tobacco: Never Used     Alcohol use Yes      Comment: social         Review of Systems  C: NEGATIVE for fever, chills, change in weight  I: Laceration  E: NEGATIVE for vision changes or irritation  ENT: NEGATIVE for ear, mouth and throat problems  R: NEGATIVE for significant cough or SOB  CV: NEGATIVE for chest pain, palpitations or peripheral edema  GI: NEGATIVE for nausea, abdominal pain, heartburn, or change in bowel habits  : NEGATIVE for unusual urinary or vaginal symptoms. Periods are regular.  M: NEGATIVE for significant arthralgias or myalgia  N: NEGATIVE for weakness, dizziness or paresthesias  P: NEGATIVE for changes in mood or affect     This document serves as a record of the services and decisions personally performed and made by Zak Nicole MD. It was created on his  "behalf by Clair Flores, a trained medical scribe.  The creation of this document is based on the scribe's personal observations and the provider's statements to the medical scribe.  Clair Flores, March 13, 2018 1:18 PM    OBJECTIVE:   There were no vitals taken for this visit.   /86 (BP Location: Right arm, Patient Position: Chair, Cuff Size: Adult Regular)  Pulse 106  Temp 98.1  F (36.7  C) (Oral)  Resp 14  Ht 5' 7.5\" (1.715 m)  Wt 143 lb (64.9 kg)  LMP  (LMP Unknown)  BMI 22.07 kg/m2    Physical Exam  GENERAL: healthy, alert angry  EYES: Eyes grossly normal to inspection, PERRL and conjunctivae and sclerae normal  HENT: ear canals and TM's normal, nose and mouth without ulcers or lesions  NECK: no adenopathy, no asymmetry, masses, or scars and thyroid normal to palpation  RESP: lungs clear to auscultation - no rales, rhonchi or wheezes  CV: regular rate and rhythm, normal S1 S2, no S3 or S4, no murmur, click or rub, no peripheral edema and peripheral pulses strong  ABDOMEN: soft, nontender, no hepatosplenomegaly, no masses and bowel sounds normal  MS: no gross musculoskeletal defects noted, no edema  SKIN: Closed noninfected eschared abrasion on forehead near hairline  PSYCH: Impatient    ASSESSMENT/PLAN:   ASSESSMENT / PLAN:  (Z00.00) Routine general medical examination at a health care facility  (primary encounter diagnosis)  Comment: Pap smear requested records otherwise complete  Plan:     (Z12.4) Screening for malignant neoplasm of cervix  Comment: Records pending      (F33.0) Mild recurrent major depression (H)  Comment: Refill   PHQ-9 SCORE 12/27/2010 9/26/2014 3/13/2018   Total Score 2 3 -   Total Score - - 4     In remission  Plan: buPROPion (WELLBUTRIN XL) 150 MG 24 hr tablet            (J45.20) Intermittent asthma, uncomplicated  Comment: Controlled ACT 23  Plan: albuterol (PROAIR HFA/PROVENTIL HFA/VENTOLIN         HFA) 108 (90 BASE) MCG/ACT Inhaler            (F98.8) Attention deficit " "disorder (ADD) without hyperactivity  Comment: See additional note  Plan: Drug  Screen Comprehensive, Urine w/o Reported         Meds (Pain Care Package),         amphetamine-dextroamphetamine (ADDERALL) 30 MG         per tablet, DISCONTINUED:         amphetamine-dextroamphetamine (ADDERALL) 30 MG         per tablet, DISCONTINUED:         amphetamine-dextroamphetamine (ADDERALL) 30 MG         per tablet              (Z91.14) Controlled substance agreement broken  Comment: See additional note  Plan:     (S01.81XA) Laceration of forehead, initial encounter  Comment: Wound care discussed.  This is approximately 24 hours old.  She wonders if sutures are needed.  This is less than 1 cm  Plan: I recommend allowing us to close by secondary intent              RTC routinely      COUNSELING:  Reviewed preventive health counseling, as reflected in patient instructions       reports that she has never smoked. She has never used smokeless tobacco.    Estimated body mass index is 20.79 kg/(m^2) as calculated from the following:    Height as of 2/9/17: 5' 9\" (1.753 m).    Weight as of 2/9/17: 140 lb 12.8 oz (63.9 kg).       Counseling Resources:  ATP IV Guidelines  Pooled Cohorts Equation Calculator  Breast Cancer Risk Calculator  FRAX Risk Assessment  ICSI Preventive Guidelines  Dietary Guidelines for Americans, 2010  USDA's MyPlate  ASA Prophylaxis  Lung CA Screening    Zak Nicole MD  [facility]  Answers for HPI/ROS submitted by the patient on 3/13/2018   PHQ-2 Score: 2  The information in this document, created by the medical scribe for me, accurately reflects the services I personally performed and the decisions made by me. I have reviewed and approved this document for accuracy prior to leaving the patient care area.  Zak Nicole MD March 13, 2018 1:18 PM    "

## 2018-03-13 NOTE — PROGRESS NOTES
Adderall XR 30 #34 4/5/18  Adderall XR 30 # 32 5/09 end Xochilt 10    Adderall 30 # 30  3/14                      #30  4/13                     #30  5/11   End Xochilt 10    Patient has self adjusted her Adderall to 1-1/2 daily, which explains refill date difficulties in the record.  We write prescriptions as above.  All other prescriptions written in error are and documented in the medical record have been destroyed.    However this is a second violation of the patient's controlled substance contract.  We have written her letter informing her that we will no longer be able to supply Adderall when these prescriptions have run out.  Zak Nicole

## 2018-03-13 NOTE — MR AVS SNAPSHOT
After Visit Summary   3/13/2018    Delicia Zhou    MRN: [id number]           Patient Information     Date Of Birth          1980        Visit Information        Provider Department      3/13/2018 1:00 PM Zak Nicole MD [facility] [place]        Today's Diagnoses     Routine general medical examination at a health care facility    -  1    Screening for malignant neoplasm of cervix        Intermittent asthma, uncomplicated        Major depression in complete remission (H)        Attention deficit disorder (ADD) without hyperactivity        Controlled substance agreement broken        Laceration of forehead, initial encounter          Care Instructions      Preventive Health Recommendations  Female Ages 26 - 39  Yearly exam:   See your health care provider every year in order to    Review health changes.     Discuss preventive care.      Review your medicines if you your doctor has prescribed any.    Until age 30: Get a Pap test every three years (more often if you have had an abnormal result).    After age 30: Talk to your doctor about whether you should have a Pap test every 3 years or have a Pap test with HPV screening every 5 years.   You do not need a Pap test if your uterus was removed (hysterectomy) and you have not had cancer.  You should be tested each year for STDs (sexually transmitted diseases), if you're at risk.   Talk to your provider about how often to have your cholesterol checked.  If you are at risk for diabetes, you should have a diabetes test (fasting glucose).  Shots: Get a flu shot each year. Get a tetanus shot every 10 years.   Nutrition:     Eat at least 5 servings of fruits and vegetables each day.    Eat whole-grain bread, whole-wheat pasta and brown rice instead of white grains and rice.    Talk to your provider about Calcium and Vitamin D.     Lifestyle    Exercise at least 150 minutes a week (30 minutes a day, 5 days of the week). This will help you  "control your weight and prevent disease.    Limit alcohol to one drink per day.    No smoking.     Wear sunscreen to prevent skin cancer.    See your dentist every six months for an exam and cleaning.            Follow-ups after your visit        Who to contact     If you have questions or need follow up information about today's clinic visit or your schedule please contact [facility] directly at [phone].  Normal or non-critical lab and imaging results will be communicated to you by [employer], letter or phone within 4 business days after the clinic has received the results. If you do not hear from us within 7 days, please contact the clinic through [employer] or phone. If you have a critical or abnormal lab result, we will notify you by phone as soon as possible.  Submit refill requests through [employer] or call your pharmacy and they will forward the refill request to us. Please allow 3 business days for your refill to be completed.          Additional Information About Your Visit        [employer] Information     [employer] gives you secure access to your electronic health record. If you see a primary care provider, you can also send messages to your care team and make appointments. If you have questions, please call your primary care clinic.  If you do not have a primary care provider, please call [phone] and they will assist you.        Care EveryWhere ID     This is your Care EveryWhere ID. This could be used by other organizations to access your [place] medical records  [id number]        Your Vitals Were     Pulse Temperature Respirations Height Last Period BMI (Body Mass Index)    106 98.1  F (36.7  C) (Oral) 14 5' 7.5\" (1.715 m) (LMP Unknown) 22.07 kg/m2       Blood Pressure from Last 3 Encounters:   03/13/18 120/86   02/09/17 137/84   01/05/17 123/74    Weight from Last 3 Encounters:   03/13/18 143 lb (64.9 kg)   02/09/17 140 lb 12.8 oz (63.9 kg)   01/05/17 145 lb (65.8 kg)            "   We Performed the Following     Drug  Screen Comprehensive, Urine w/o Reported Meds (Pain Care Package)          Today's Medication Changes          These changes are accurate as of 3/13/18  3:55 PM.  If you have any questions, ask your nurse or doctor.               These medicines have changed or have updated prescriptions.        Dose/Directions    * amphetamine-dextroamphetamine 30 MG per tablet   Commonly known as:  ADDERALL   This may have changed:  You were already taking a medication with the same name, and this prescription was added. Make sure you understand how and when to take each.   Used for:  Attention deficit disorder (ADD) without hyperactivity   Replaces:  amphetamine-dextroamphetamine 20 MG per tablet   Changed by:  Zak Nicole MD        Dose:  30 mg   Start taking on:  3/14/2018   Take 1 tablet (30 mg) by mouth daily   Quantity:  30 tablet   Refills:  0       * amphetamine-dextroamphetamine 30 MG per 24 hr capsule   Commonly known as:  ADDERALL XR   This may have changed:    - Another medication with the same name was added. Make sure you understand how and when to take each.  - Another medication with the same name was removed. Continue taking this medication, and follow the directions you see here.   Used for:  Attention deficit disorder (ADD) without hyperactivity   Changed by:  Zak Nicole MD        Dose:  30 mg   Start taking on:  4/5/2018   Take 1 capsule (30 mg) by mouth daily   Quantity:  34 capsule   Refills:  0       * amphetamine-dextroamphetamine 30 MG per tablet   Commonly known as:  ADDERALL   This may have changed:  You were already taking a medication with the same name, and this prescription was added. Make sure you understand how and when to take each.   Used for:  Attention deficit disorder (ADD) without hyperactivity   Replaces:  amphetamine-dextroamphetamine 20 MG per tablet   Changed by:  Zak Nicole MD        Dose:  30 mg   Start taking on:  5/9/2018   Take 1  tablet (30 mg) by mouth daily   Quantity:  32 tablet   Refills:  0       * amphetamine-dextroamphetamine 30 MG per 24 hr capsule   Commonly known as:  ADDERALL XR   This may have changed:  You were already taking a medication with the same name, and this prescription was added. Make sure you understand how and when to take each.   Used for:  Attention deficit disorder (ADD) without hyperactivity   Replaces:  amphetamine-dextroamphetamine 20 MG per tablet   Changed by:  Zak Nicole MD        Dose:  30 mg   Start taking on:  5/9/2018   Take 1 capsule (30 mg) by mouth daily   Quantity:  32 capsule   Refills:  0       * amphetamine-dextroamphetamine 30 MG per tablet   Commonly known as:  ADDERALL   This may have changed:  You were already taking a medication with the same name, and this prescription was added. Make sure you understand how and when to take each.   Used for:  Attention deficit disorder (ADD) without hyperactivity   Replaces:  amphetamine-dextroamphetamine 30 MG per 24 hr capsule   Changed by:  Zak Nicole MD        Dose:  30 mg   Start taking on:  5/11/2018   Take 1 tablet (30 mg) by mouth daily   Quantity:  30 tablet   Refills:  0       * Notice:  This list has 5 medication(s) that are the same as other medications prescribed for you. Read the directions carefully, and ask your doctor or other care provider to review them with you.      Stop taking these medicines if you haven't already. Please contact your care team if you have questions.     amphetamine-dextroamphetamine 20 MG per tablet   Commonly known as:  ADDERALL   Replaced by:  amphetamine-dextroamphetamine 30 MG per tablet   You also have another medication with the same name that you need to continue taking as instructed.   Stopped by:  Zak Nicole MD           amphetamine-dextroamphetamine 20 MG per tablet   Commonly known as:  ADDERALL   Replaced by:  amphetamine-dextroamphetamine 30 MG per tablet   You also have another  medication with the same name that you need to continue taking as instructed.   Stopped by:  Zak Nicole MD           amphetamine-dextroamphetamine 20 MG per tablet   Commonly known as:  ADDERALL   Replaced by:  amphetamine-dextroamphetamine 30 MG per 24 hr capsule   You also have another medication with the same name that you need to continue taking as instructed.   Stopped by:  Zak Nicole MD           amphetamine-dextroamphetamine 30 MG per 24 hr capsule   Commonly known as:  ADDERALL XR   Replaced by:  amphetamine-dextroamphetamine 30 MG per tablet   You also have another medication with the same name that you need to continue taking as instructed.   Stopped by:  Zak Nicole MD                Where to get your medicines      These medications were sent to [facility] [address] [place] [address] AT [facility] [address] & [facility] [address]  [address] [place] [id number]     Phone:  [phone]     albuterol 108 (90 BASE) MCG/ACT Inhaler    buPROPion 150 MG 24 hr tablet         Some of these will need a paper prescription and others can be bought over the counter.  Ask your nurse if you have questions.     Bring a paper prescription for each of these medications     amphetamine-dextroamphetamine 30 MG per 24 hr capsule    amphetamine-dextroamphetamine 30 MG per 24 hr capsule    amphetamine-dextroamphetamine 30 MG per tablet    amphetamine-dextroamphetamine 30 MG per tablet    amphetamine-dextroamphetamine 30 MG per tablet                Primary Care Provider Office Phone # Fax #    Zak Nicole [phone] [address] [facility] [address]        Equal Access to Services     [facility] AH: Hadii aad ku hadasho Soyousifali, waaxda luqadaha, qaybta kaalmada adeegyada, jersey loza. So [facility] [phone].    ATENCIÓN: Si habla español, tiene a mtz disposición servicios gratuitos de asistencia lingüística. Llame al  [phone].    We comply with applicable federal civil rights laws and [place] laws. We do not discriminate on the basis of race, color, national origin, age, disability, sex, sexual orientation, or gender identity.            Thank you!     Thank you for choosing [facility]  for your care. Our goal is always to provide you with excellent care. Hearing back from our patients is one way we can continue to improve our services. Please take a few minutes to complete the written survey that you may receive in the mail after your visit with us. Thank you!             Your Updated Medication List - Protect others around you: Learn how to safely use, store and throw away your medicines at www.disposemymeds.org.          This list is accurate as of 3/13/18  3:55 PM.  Always use your most recent med list.                   Brand Name Dispense Instructions for use Diagnosis    albuterol 108 (90 BASE) MCG/ACT Inhaler    PROAIR HFA/PROVENTIL HFA/VENTOLIN HFA    1 Inhaler    Inhale 2 puffs into the lungs every 6 hours as needed for shortness of breath / dyspnea    Intermittent asthma, uncomplicated       * amphetamine-dextroamphetamine 30 MG per tablet   Start taking on:  3/14/2018    ADDERALL    30 tablet    Take 1 tablet (30 mg) by mouth daily    Attention deficit disorder (ADD) without hyperactivity       * amphetamine-dextroamphetamine 30 MG per 24 hr capsule   Start taking on:  4/5/2018    ADDERALL XR    34 capsule    Take 1 capsule (30 mg) by mouth daily    Attention deficit disorder (ADD) without hyperactivity       * amphetamine-dextroamphetamine 30 MG per tablet   Start taking on:  5/9/2018    ADDERALL    32 tablet    Take 1 tablet (30 mg) by mouth daily    Attention deficit disorder (ADD) without hyperactivity       * amphetamine-dextroamphetamine 30 MG per 24 hr capsule   Start taking on:  5/9/2018    ADDERALL XR    32 capsule    Take 1 capsule (30 mg) by mouth daily    Attention deficit  disorder (ADD) without hyperactivity       * amphetamine-dextroamphetamine 30 MG per tablet   Start taking on:  5/11/2018    ADDERALL    30 tablet    Take 1 tablet (30 mg) by mouth daily    Attention deficit disorder (ADD) without hyperactivity       buPROPion 150 MG 24 hr tablet    WELLBUTRIN XL    90 tablet    Take 1 tablet (150 mg) by mouth every morning    Major depression in complete remission (H)       cetirizine 10 MG tablet    zyrTEC    90 tablet    Take 1 tablet (10 mg) by mouth every evening    Allergic state, subsequent encounter       * Notice:  This list has 5 medication(s) that are the same as other medications prescribed for you. Read the directions carefully, and ask your doctor or other care provider to review them with you.

## 2018-03-13 NOTE — LETTER
[facility]  [id number] [place] [id number]  [phone]        March 13, 2018    Delicia Zhou  [address] [address]              Dear Delicia Zhou    We have sent your prescriptions in a separate letter I have adjusted the quantity of the Adderall XR, so that by the end of the prescriptions they should be getting filled on the same day.    However, you have broken your controlled substance agreement.  Self adjustment of your dosing is against your contract, and for this reason we will no longer be able to supply you Adderall from this office.    Your 3 month supply should give you time to find another provider to prescribe these medications for you at your convenience.    Although we would be happy to continue providing you primary care, it might be more convenient for you to find somebody closer to your home in [place].          Sincerely,        Zak Nicole MD

## 2018-03-14 ASSESSMENT — ASTHMA QUESTIONNAIRES: ACT_TOTALSCORE: 23

## 2018-03-14 ASSESSMENT — PATIENT HEALTH QUESTIONNAIRE - PHQ9: SUM OF ALL RESPONSES TO PHQ QUESTIONS 1-9: 4

## 2018-03-14 ASSESSMENT — ANXIETY QUESTIONNAIRES: GAD7 TOTAL SCORE: 5

## 2018-03-15 ENCOUNTER — TELEPHONE (OUTPATIENT)
Dept: FAMILY MEDICINE | Facility: CLINIC | Age: 38
End: 2018-03-15

## 2018-03-15 LAB — COMPREHEN DRUG ANALYSIS UR: NORMAL

## 2018-03-20 NOTE — TELEPHONE ENCOUNTER
Another fax from [employer] for PA.  Encounter closed but I don't see PA?  Please do PA.  Pham Marie RN    Routed by: Zak Nicole MD                  on Mon Mar 5, 2018  3:26 PM        Routed to: Jhonny Augustin Auth   Rosette: Routine  on Mon Mar 05, 2018  3:26 PM    Opened by: Pham Marie RN *Pool member* on Mon Mar 5, 2018  4:12 PM        Pended by: Pham Marie RN *Pool member* on Mon Mar 5, 2018  4:13 PM         Doned by: Pham Marie RN *Pool member* on Mon Mar 5, 2018  4:13 PM          Routed by: Pham Marie RN               on Mon Mar 5, 2018  4:13 PM        Routed to: [facility]            Rosette: Routine  on Mon Mar 05, 2018  4:13 PM     Doned by: Ciarra Swain *Pool member*       on Wed Mar 7, 2018  2:59 PM                                        End of Report

## 2018-03-20 NOTE — TELEPHONE ENCOUNTER
Prior Authorization Approval    Authorization Effective Date: 3/20/2018  Authorization Expiration Date: 3/20/2020  Medication: D-Amphetamine Salt Com  Approved Dose/Quantity:    Reference #: [id number]   Insurance Company: [employer] - Phone [phone] Fax [phone]  Expected CoPay:       CoPay Card Available:      Foundation Assistance Needed:    Which Pharmacy is filling the prescription (Not needed for infusion/clinic administered): [facility] [address] [place] [address] AT [facility] OF  & [address]  Pharmacy Notified: Yes  Patient Notified: Yes

## 2018-03-20 NOTE — TELEPHONE ENCOUNTER
Central Prior Authorization Team   Phone: [phone]    PA Initiation    Medication: D-Amphetamine Salt Com  Insurance Company: [employer] - Phone [phone] Fax [phone]  Pharmacy Filling the Rx: [email or web address] [address] [place] [address] AT [facility] OF  [address]  Filling Pharmacy Phone: [phone]  Filling Pharmacy Fax:    Start Date: 3/20/2018

## 2018-04-17 ENCOUNTER — TELEPHONE (OUTPATIENT)
Dept: FAMILY MEDICINE | Facility: CLINIC | Age: 38
End: 2018-04-17

## 2018-04-17 NOTE — TELEPHONE ENCOUNTER
Patient calling and upset regarding being charged for drug screen.  Discussed that the drug screen is routine once per year if on controlled med.  Upset got letter can not be seen anymore due to self adjusted med but then got RX for higher dose so feels provider obviously agreed with change.  Discussed give time to find new provider.  Does not feel things like this should be in chart that she self adjusted as feels will red flag her in future.  Advised would route message with her concerns but states has been wanting to change providers anyway.  Long conversation about controlled medications, process, etc.  She is upset and states was unaware she was having drug test, lack of communication, etc.  Drug screen was not her actual concern but that she could no longer be seen.  FYI to Dr. Nicole.  Pham Marie RN

## 2018-08-04 NOTE — TELEPHONE ENCOUNTER
PA request from [employer] for generic Adderall XR 30 mg.  Do you want PA?  Please advise and give reasoning.  Pham Marie RN     - - -

## 2018-10-18 ENCOUNTER — TRANSFERRED RECORDS (OUTPATIENT)
Dept: HEALTH INFORMATION MANAGEMENT | Facility: CLINIC | Age: 38
End: 2018-10-18

## 2018-12-14 ENCOUNTER — TRANSFERRED RECORDS (OUTPATIENT)
Dept: HEALTH INFORMATION MANAGEMENT | Facility: CLINIC | Age: 38
End: 2018-12-14

## 2019-01-17 ENCOUNTER — TRANSFERRED RECORDS (OUTPATIENT)
Dept: HEALTH INFORMATION MANAGEMENT | Facility: CLINIC | Age: 39
End: 2019-01-17

## 2019-01-22 ENCOUNTER — HOSPITAL ENCOUNTER (OUTPATIENT)
Facility: CLINIC | Age: 39
Discharge: HOME | End: 2019-01-22
Attending: OBSTETRICS & GYNECOLOGY | Admitting: OBSTETRICS & GYNECOLOGY
Payer: COMMERCIAL

## 2019-01-22 ENCOUNTER — TRANSFERRED RECORDS (OUTPATIENT)
Dept: HEALTH INFORMATION MANAGEMENT | Facility: CLINIC | Age: 39
End: 2019-01-22

## 2019-01-22 ENCOUNTER — ANESTHESIA EVENT (OUTPATIENT)
Dept: SURGERY | Facility: CLINIC | Age: 39
End: 2019-01-22
Payer: COMMERCIAL

## 2019-01-22 ENCOUNTER — ANESTHESIA (OUTPATIENT)
Dept: SURGERY | Facility: CLINIC | Age: 39
End: 2019-01-22
Payer: COMMERCIAL

## 2019-01-22 VITALS
TEMPERATURE: 98.4 F | DIASTOLIC BLOOD PRESSURE: 75 MMHG | RESPIRATION RATE: 16 BRPM | OXYGEN SATURATION: 95 % | HEART RATE: 87 BPM | HEIGHT: 69 IN | WEIGHT: 149.7 LBS | SYSTOLIC BLOOD PRESSURE: 122 MMHG | BODY MASS INDEX: 22.17 KG/M2

## 2019-01-22 DIAGNOSIS — G89.18 POST-OP PAIN: Primary | ICD-10-CM

## 2019-01-22 LAB
B-HCG SERPL-ACNC: <1 IU/L (ref 0–5)
HGB BLD-MCNC: 14.1 G/DL (ref 11.7–15.7)

## 2019-01-22 PROCEDURE — 27210794 ZZH OR GENERAL SUPPLY STERILE: Performed by: OBSTETRICS & GYNECOLOGY

## 2019-01-22 PROCEDURE — 71000027 ZZH RECOVERY PHASE 2 EACH 15 MINS: Performed by: OBSTETRICS & GYNECOLOGY

## 2019-01-22 PROCEDURE — 25000125 ZZHC RX 250: Performed by: NURSE ANESTHETIST, CERTIFIED REGISTERED

## 2019-01-22 PROCEDURE — 36415 COLL VENOUS BLD VENIPUNCTURE: CPT | Performed by: PHYSICIAN ASSISTANT

## 2019-01-22 PROCEDURE — 25000566 ZZH SEVOFLURANE, EA 15 MIN: Performed by: OBSTETRICS & GYNECOLOGY

## 2019-01-22 PROCEDURE — 36000052 ZZH SURGERY LEVEL 2 EA 15 ADDTL MIN: Performed by: OBSTETRICS & GYNECOLOGY

## 2019-01-22 PROCEDURE — 25000125 ZZHC RX 250: Performed by: OBSTETRICS & GYNECOLOGY

## 2019-01-22 PROCEDURE — 84702 CHORIONIC GONADOTROPIN TEST: CPT | Performed by: PHYSICIAN ASSISTANT

## 2019-01-22 PROCEDURE — 25000128 H RX IP 250 OP 636: Performed by: ANESTHESIOLOGY

## 2019-01-22 PROCEDURE — 85018 HEMOGLOBIN: CPT | Performed by: PHYSICIAN ASSISTANT

## 2019-01-22 PROCEDURE — 71000012 ZZH RECOVERY PHASE 1 LEVEL 1 FIRST HR: Performed by: OBSTETRICS & GYNECOLOGY

## 2019-01-22 PROCEDURE — 37000009 ZZH ANESTHESIA TECHNICAL FEE, EACH ADDTL 15 MIN: Performed by: OBSTETRICS & GYNECOLOGY

## 2019-01-22 PROCEDURE — 36000050 ZZH SURGERY LEVEL 2 1ST 30 MIN: Performed by: OBSTETRICS & GYNECOLOGY

## 2019-01-22 PROCEDURE — 88307 TISSUE EXAM BY PATHOLOGIST: CPT | Mod: 26 | Performed by: OBSTETRICS & GYNECOLOGY

## 2019-01-22 PROCEDURE — 71000013 ZZH RECOVERY PHASE 1 LEVEL 1 EA ADDTL HR: Performed by: OBSTETRICS & GYNECOLOGY

## 2019-01-22 PROCEDURE — 88307 TISSUE EXAM BY PATHOLOGIST: CPT | Performed by: OBSTETRICS & GYNECOLOGY

## 2019-01-22 PROCEDURE — 37000008 ZZH ANESTHESIA TECHNICAL FEE, 1ST 30 MIN: Performed by: OBSTETRICS & GYNECOLOGY

## 2019-01-22 PROCEDURE — 25000128 H RX IP 250 OP 636: Performed by: NURSE ANESTHETIST, CERTIFIED REGISTERED

## 2019-01-22 PROCEDURE — 40000170 ZZH STATISTIC PRE-PROCEDURE ASSESSMENT II: Performed by: OBSTETRICS & GYNECOLOGY

## 2019-01-22 RX ORDER — NALOXONE HYDROCHLORIDE 0.4 MG/ML
.1-.4 INJECTION, SOLUTION INTRAMUSCULAR; INTRAVENOUS; SUBCUTANEOUS
Status: DISCONTINUED | OUTPATIENT
Start: 2019-01-22 | End: 2019-01-22 | Stop reason: HOSPADM

## 2019-01-22 RX ORDER — ACETAMINOPHEN 325 MG/1
650 TABLET ORAL
Status: DISCONTINUED | OUTPATIENT
Start: 2019-01-22 | End: 2019-01-22 | Stop reason: HOSPADM

## 2019-01-22 RX ORDER — MEPERIDINE HYDROCHLORIDE 25 MG/ML
12.5 INJECTION INTRAMUSCULAR; INTRAVENOUS; SUBCUTANEOUS
Status: DISCONTINUED | OUTPATIENT
Start: 2019-01-22 | End: 2019-01-22 | Stop reason: HOSPADM

## 2019-01-22 RX ORDER — DEXAMETHASONE SODIUM PHOSPHATE 4 MG/ML
INJECTION, SOLUTION INTRA-ARTICULAR; INTRALESIONAL; INTRAMUSCULAR; INTRAVENOUS; SOFT TISSUE PRN
Status: DISCONTINUED | OUTPATIENT
Start: 2019-01-22 | End: 2019-01-22

## 2019-01-22 RX ORDER — ONDANSETRON 4 MG/1
4 TABLET, ORALLY DISINTEGRATING ORAL
Status: DISCONTINUED | OUTPATIENT
Start: 2019-01-22 | End: 2019-01-22 | Stop reason: HOSPADM

## 2019-01-22 RX ORDER — METHOCARBAMOL 750 MG/1
750 TABLET, FILM COATED ORAL
Status: DISCONTINUED | OUTPATIENT
Start: 2019-01-22 | End: 2019-01-22 | Stop reason: HOSPADM

## 2019-01-22 RX ORDER — ONDANSETRON 2 MG/ML
4 INJECTION INTRAMUSCULAR; INTRAVENOUS EVERY 30 MIN PRN
Status: DISCONTINUED | OUTPATIENT
Start: 2019-01-22 | End: 2019-01-22 | Stop reason: HOSPADM

## 2019-01-22 RX ORDER — LIDOCAINE HYDROCHLORIDE 20 MG/ML
INJECTION, SOLUTION INFILTRATION; PERINEURAL PRN
Status: DISCONTINUED | OUTPATIENT
Start: 2019-01-22 | End: 2019-01-22

## 2019-01-22 RX ORDER — PROPOFOL 10 MG/ML
INJECTION, EMULSION INTRAVENOUS PRN
Status: DISCONTINUED | OUTPATIENT
Start: 2019-01-22 | End: 2019-01-22

## 2019-01-22 RX ORDER — ONDANSETRON 2 MG/ML
INJECTION INTRAMUSCULAR; INTRAVENOUS PRN
Status: DISCONTINUED | OUTPATIENT
Start: 2019-01-22 | End: 2019-01-22

## 2019-01-22 RX ORDER — IBUPROFEN 600 MG/1
600 TABLET, FILM COATED ORAL EVERY 6 HOURS PRN
Qty: 30 TABLET | Refills: 0 | Status: ON HOLD | OUTPATIENT
Start: 2019-01-22 | End: 2019-02-26

## 2019-01-22 RX ORDER — HYDROXYZINE HYDROCHLORIDE 25 MG/1
25 TABLET, FILM COATED ORAL
Status: DISCONTINUED | OUTPATIENT
Start: 2019-01-22 | End: 2019-01-22 | Stop reason: HOSPADM

## 2019-01-22 RX ORDER — SODIUM CHLORIDE, SODIUM LACTATE, POTASSIUM CHLORIDE, CALCIUM CHLORIDE 600; 310; 30; 20 MG/100ML; MG/100ML; MG/100ML; MG/100ML
INJECTION, SOLUTION INTRAVENOUS CONTINUOUS
Status: DISCONTINUED | OUTPATIENT
Start: 2019-01-22 | End: 2019-01-22 | Stop reason: HOSPADM

## 2019-01-22 RX ORDER — HYDROCODONE BITARTRATE AND ACETAMINOPHEN 5; 325 MG/1; MG/1
1-2 TABLET ORAL EVERY 4 HOURS PRN
Qty: 10 TABLET | Refills: 0 | Status: SHIPPED | OUTPATIENT
Start: 2019-01-22 | End: 2019-02-15

## 2019-01-22 RX ORDER — HYDROCODONE BITARTRATE AND ACETAMINOPHEN 5; 325 MG/1; MG/1
1 TABLET ORAL
Status: DISCONTINUED | OUTPATIENT
Start: 2019-01-22 | End: 2019-01-22 | Stop reason: HOSPADM

## 2019-01-22 RX ORDER — FENTANYL CITRATE 50 UG/ML
25-50 INJECTION, SOLUTION INTRAMUSCULAR; INTRAVENOUS
Status: DISCONTINUED | OUTPATIENT
Start: 2019-01-22 | End: 2019-01-22 | Stop reason: HOSPADM

## 2019-01-22 RX ORDER — IODINE AND POTASSIUM IODIDE 50; 100 MG/ML; MG/ML
LIQUID ORAL PRN
Status: DISCONTINUED | OUTPATIENT
Start: 2019-01-22 | End: 2019-01-22 | Stop reason: HOSPADM

## 2019-01-22 RX ORDER — ONDANSETRON 4 MG/1
4 TABLET, ORALLY DISINTEGRATING ORAL EVERY 30 MIN PRN
Status: DISCONTINUED | OUTPATIENT
Start: 2019-01-22 | End: 2019-01-22 | Stop reason: HOSPADM

## 2019-01-22 RX ORDER — HYDROMORPHONE HYDROCHLORIDE 1 MG/ML
.3-.5 INJECTION, SOLUTION INTRAMUSCULAR; INTRAVENOUS; SUBCUTANEOUS EVERY 10 MIN PRN
Status: DISCONTINUED | OUTPATIENT
Start: 2019-01-22 | End: 2019-01-22 | Stop reason: HOSPADM

## 2019-01-22 RX ORDER — SODIUM CHLORIDE, SODIUM LACTATE, POTASSIUM CHLORIDE, CALCIUM CHLORIDE 600; 310; 30; 20 MG/100ML; MG/100ML; MG/100ML; MG/100ML
INJECTION, SOLUTION INTRAVENOUS CONTINUOUS PRN
Status: DISCONTINUED | OUTPATIENT
Start: 2019-01-22 | End: 2019-01-22

## 2019-01-22 RX ORDER — FENTANYL CITRATE 50 UG/ML
INJECTION, SOLUTION INTRAMUSCULAR; INTRAVENOUS PRN
Status: DISCONTINUED | OUTPATIENT
Start: 2019-01-22 | End: 2019-01-22

## 2019-01-22 RX ADMIN — FENTANYL CITRATE 50 MCG: 50 INJECTION, SOLUTION INTRAMUSCULAR; INTRAVENOUS at 07:51

## 2019-01-22 RX ADMIN — DEXAMETHASONE SODIUM PHOSPHATE 4 MG: 4 INJECTION, SOLUTION INTRA-ARTICULAR; INTRALESIONAL; INTRAMUSCULAR; INTRAVENOUS; SOFT TISSUE at 07:38

## 2019-01-22 RX ADMIN — PHENYLEPHRINE HYDROCHLORIDE 100 MCG: 10 INJECTION, SOLUTION INTRAMUSCULAR; INTRAVENOUS; SUBCUTANEOUS at 08:03

## 2019-01-22 RX ADMIN — FENTANYL CITRATE 50 MCG: 50 INJECTION, SOLUTION INTRAMUSCULAR; INTRAVENOUS at 07:32

## 2019-01-22 RX ADMIN — LIDOCAINE HYDROCHLORIDE 60 MG: 20 INJECTION, SOLUTION INFILTRATION; PERINEURAL at 07:32

## 2019-01-22 RX ADMIN — PHENYLEPHRINE HYDROCHLORIDE 100 MCG: 10 INJECTION, SOLUTION INTRAMUSCULAR; INTRAVENOUS; SUBCUTANEOUS at 07:55

## 2019-01-22 RX ADMIN — MIDAZOLAM 2 MG: 1 INJECTION INTRAMUSCULAR; INTRAVENOUS at 07:29

## 2019-01-22 RX ADMIN — SODIUM CHLORIDE, POTASSIUM CHLORIDE, SODIUM LACTATE AND CALCIUM CHLORIDE: 600; 310; 30; 20 INJECTION, SOLUTION INTRAVENOUS at 07:28

## 2019-01-22 RX ADMIN — FENTANYL CITRATE 50 MCG: 50 INJECTION, SOLUTION INTRAMUSCULAR; INTRAVENOUS at 09:37

## 2019-01-22 RX ADMIN — FENTANYL CITRATE 50 MCG: 50 INJECTION, SOLUTION INTRAMUSCULAR; INTRAVENOUS at 09:29

## 2019-01-22 RX ADMIN — SODIUM CHLORIDE, POTASSIUM CHLORIDE, SODIUM LACTATE AND CALCIUM CHLORIDE: 600; 310; 30; 20 INJECTION, SOLUTION INTRAVENOUS at 09:50

## 2019-01-22 RX ADMIN — PHENYLEPHRINE HYDROCHLORIDE 100 MCG: 10 INJECTION, SOLUTION INTRAMUSCULAR; INTRAVENOUS; SUBCUTANEOUS at 07:40

## 2019-01-22 RX ADMIN — PROPOFOL 200 MG: 10 INJECTION, EMULSION INTRAVENOUS at 07:32

## 2019-01-22 RX ADMIN — PHENYLEPHRINE HYDROCHLORIDE 100 MCG: 10 INJECTION, SOLUTION INTRAMUSCULAR; INTRAVENOUS; SUBCUTANEOUS at 07:51

## 2019-01-22 RX ADMIN — ONDANSETRON 4 MG: 2 INJECTION INTRAMUSCULAR; INTRAVENOUS at 07:45

## 2019-01-22 RX ADMIN — HYDROMORPHONE HYDROCHLORIDE 0.5 MG: 1 INJECTION, SOLUTION INTRAMUSCULAR; INTRAVENOUS; SUBCUTANEOUS at 09:47

## 2019-01-22 ASSESSMENT — MIFFLIN-ST. JEOR: SCORE: 1423.41

## 2019-01-22 ASSESSMENT — ENCOUNTER SYMPTOMS: SEIZURES: 0

## 2019-01-22 NOTE — ANESTHESIA PREPROCEDURE EVALUATION
Anesthesia Pre-Procedure Evaluation    Patient: Delicia Zhou   MRN: [id number] : 1980          Preoperative Diagnosis: ENDOCERVICAL ADENO CARCINOMA INSITU    Procedure(s):  COLD KNIFE CONE BIOPSY    Past Medical History:   Diagnosis Date     ADD (attention deficit disorder)     ADD +      Anxiety 2012    2x week- 3x week     Attention deficit disorder (ADD) without hyperactivity 2017     Body dysmorphic disorder 2015     Controlled substance agreement broken 3/13/2018     Controlled substance agreement signed 2016    Corey Adderall XL 30 Adderall 20 #30 Ea q mo      Elevated blood pressure reading without diagnosis of hypertension 2016     Headache(784.0)     MRI negative     Intermittent asthma     seasonal + using albuterol      Mild recurrent major depression (H) 2018     Past Surgical History:   Procedure Laterality Date     GYN SURGERY      TUBAL LIGATION     HC ENLARGE BREAST WITH IMPLANT  10/2005    Dr. Helton       Anesthesia Evaluation     . Pt has had prior anesthetic.     No history of anesthetic complications          ROS/MED HX    ENT/Pulmonary:     (+)Intermittent asthma , . .   (-) sleep apnea   Neurologic:      (-) seizures and CVA   Cardiovascular:        (-) hypertension   METS/Exercise Tolerance:  >4 METS   Hematologic:         Musculoskeletal:         GI/Hepatic:        (-) GERD and liver disease   Renal/Genitourinary:      (-) renal disease   Endo:      (-) Type II DM and thyroid disease   Psychiatric: Comment: Hx substance abuse    (+) psychiatric history anxiety and depression      Infectious Disease:         Malignancy:         Other:                          Physical Exam  Normal systems: cardiovascular, pulmonary and dental    Airway   Mallampati: II  TM distance: >3 FB  Neck ROM: full    Dental     Cardiovascular       Pulmonary             Lab Results   Component Value Date    HGB 14.1 2019    HCG Negative 2010       Preop  "Vitals  BP Readings from Last 3 Encounters:   01/22/19 113/64   03/13/18 120/86   02/09/17 137/84    Pulse Readings from Last 3 Encounters:   01/22/19 88   03/13/18 106   02/09/17 105      Resp Readings from Last 3 Encounters:   01/22/19 18   03/13/18 14   02/09/17 14    SpO2 Readings from Last 3 Encounters:   01/22/19 100%   11/03/16 99%   05/08/15 99%      Temp Readings from Last 1 Encounters:   01/22/19 35.8  C (96.4  F) (Oral)    Ht Readings from Last 1 Encounters:   01/22/19 1.753 m (5' 9\")      Wt Readings from Last 1 Encounters:   01/22/19 67.9 kg (149 lb 11.2 oz)    Estimated body mass index is 22.11 kg/m  as calculated from the following:    Height as of this encounter: 1.753 m (5' 9\").    Weight as of this encounter: 67.9 kg (149 lb 11.2 oz).       Anesthesia Plan      History & Physical Review  History and physical reviewed and following examination; no interval change.    ASA Status:  2 .    NPO Status:  > 8 hours    Plan for General and LMA with Intravenous induction. Maintenance will be Balanced.    PONV prophylaxis:  Ondansetron (or other 5HT-3) and Dexamethasone or Solumedrol       Postoperative Care  Postoperative pain management:  Multi-modal analgesia.      Consents  Anesthetic plan, risks, benefits and alternatives discussed with:  Patient..        Procedure: Procedure(s):  COLD KNIFE CONE BIOPSY  Preop diagnosis: ENDOCERVICAL ADENO CARCINOMA INSITU    Allergies   Allergen Reactions     No Known Drug Allergies      Past Medical History:   Diagnosis Date     ADD (attention deficit disorder)     ADD +      Anxiety 11/26/2012    2x week- 3x week     Attention deficit disorder (ADD) without hyperactivity 6/29/2017     Body dysmorphic disorder 11/11/2015     Controlled substance agreement broken 3/13/2018     Controlled substance agreement signed 4/23/2016    Corey Adderall XL 30 Adderall 20 #30 Ea q mo      Elevated blood pressure reading without diagnosis of hypertension 4/23/2016     " Headache(784.0) 1994    MRI negative     Intermittent asthma     seasonal + using albuterol      Mild recurrent major depression (H) 2/8/2018     Past Surgical History:   Procedure Laterality Date     GYN SURGERY      TUBAL LIGATION     HC ENLARGE BREAST WITH IMPLANT  10/2005    Dr. Helton     Prior to Admission medications    Medication Sig Start Date End Date Taking? Authorizing Provider   albuterol (PROAIR HFA/PROVENTIL HFA/VENTOLIN HFA) 108 (90 BASE) MCG/ACT Inhaler Inhale 2 puffs into the lungs every 6 hours as needed for shortness of breath / dyspnea 3/13/18  Yes Zak Nicole MD   amphetamine-dextroamphetamine (ADDERALL) 30 MG per tablet Take 1 tablet (30 mg) by mouth daily 3/14/18  Yes Zak Nicole MD   buPROPion (WELLBUTRIN XL) 150 MG 24 hr tablet Take 1 tablet (150 mg) by mouth every morning 3/13/18  Yes Zak Nicole MD   cetirizine (ZYRTEC) 10 MG tablet Take 1 tablet (10 mg) by mouth every evening 4/22/16   Zak Nicole MD     Current Facility-Administered Medications Ordered in Epic   Medication Dose Route Frequency Last Rate Last Dose     PRE OP antibiotics NOT needed for this surgical procedure  1 each As instructed Continuous         No current [facility]-ordered outpatient medications on file.     Wt Readings from Last 1 Encounters:   01/22/19 67.9 kg (149 lb 11.2 oz)     Temp Readings from Last 1 Encounters:   01/22/19 35.8  C (96.4  F) (Oral)     BP Readings from Last 6 Encounters:   01/22/19 113/64   03/13/18 120/86   02/09/17 137/84   01/05/17 123/74   11/03/16 114/66   04/22/16 (!) 120/96     Pulse Readings from Last 4 Encounters:   01/22/19 88   03/13/18 106   02/09/17 105   01/05/17 108     Resp Readings from Last 1 Encounters:   01/22/19 18   @LASTSAO2(1)@  No results for input(s): NA, POTASSIUM, CHLORIDE, CO2, ANIONGAP, GLC, BUN, CR, SANDOVAL in the last 58065 hours.  Recent Labs   Lab Test 01/22/19  0617   HGB 14.1     No results for input(s): INR in the last 68559 hours.    Invalid  input(s): APTT   RECENT LABS:   ECG:   ECHO:   CXR:               Sebastian Leung MD

## 2019-01-22 NOTE — OP NOTE
SURGEON: Herb Mistry MD  ASSISTANT: None  PREOPERATIVE DIAGNOSIS: Adenocarcinoma in situ of the cervix  POSTOPERATIVE DIAGNOSIS: Same  OPERATION PERFORMED: Cold knife cone biopsy  ANESTHESIA: MAC  EBL: 10 mL  FLUIDS: Lactated Ringers  URINE OUTPUT: Not measured  DRAINS: None  SPECIMENS: cone biopsy, labeled short superior long left lateral  COMPLICATIONS: None  FINDINGS: Normal external pelvic anatomy.  Cervix appeared to be CIN3 with Lugol's solution  CONDITION: Stable and transported to PACU.   INDICATIONS: 39yo F with a history of HSIL Pap with atypical glandular cells followed by benign endometrial biopsy and colposcopy showing adenocarcinoma in situ on ECC and multiple biopsies presents for a cold knife cone biopsy for further evaluation and possible treatment.    OPERATION: Informed consent was obtained for the procedure and the patient was taken to the operating room with IV running. She was placed in the dorsal supine position and MAC anesthesia was obtained without difficulty and found to be adequate. The patient was then placed in a dorsal lithotomy position. The patient was prepped and draped in the normal sterile fashion. A perioperative time out was performed.   A lubricated Graves speculum was used to visualize the cervix.  A paracervical block was placed with lidocaine with epinephrine.  Stay sutures were placed on the lateral cervix with Vicryl.  Lugol's solution was used to visualize the transformation zone.  The zone was excised in a cone to include the endocervical canal.  The specimen was marked as above.  A rollerball cautery was used for excellent hemostasis.  Monsel's solution was placed.  The stay sutures were tied together.  The operative site was hemostatic on observation and the procedure was complete.  Anesthesia was reversed, she was extubated, found to be stable, and transported to the PACU.  Needle and sponge count were correct times two.  No antibiotics were needed.

## 2019-01-22 NOTE — ANESTHESIA POSTPROCEDURE EVALUATION
Patient: Delicia Zhou    Procedure(s):  COLD KNIFE CONE BIOPSY    Diagnosis:ENDOCERVICAL ADENO CARCINOMA INSITU  Diagnosis Additional Information: No value filed.    Anesthesia Type:  General, LMA    Note:  Anesthesia Post Evaluation    Patient location during evaluation: PACU  Patient participation: Able to fully participate in evaluation  Level of consciousness: awake and alert  Pain management: satisfactory to patient  Airway patency: patent  Cardiovascular status: hemodynamically stable  Respiratory status: acceptable and unassisted  Hydration status: balanced  PONV: none     Anesthetic complications: None          Last vitals:  Vitals:    01/22/19 1000 01/22/19 1015 01/22/19 1030   BP: 96/58 112/61 122/75   Pulse: 85 93 87   Resp: 8 19 16   Temp: 36.9  C (98.4  F)     SpO2: 93% 95% 95%         Electronically Signed By: Sebastian Leung MD  January 22, 2019  2:34 PM

## 2019-01-22 NOTE — ANESTHESIA CARE TRANSFER NOTE
Patient: Delicia Zhou    Procedure(s):  COLD KNIFE CONE BIOPSY    Diagnosis: ENDOCERVICAL ADENO CARCINOMA INSITU  Diagnosis Additional Information: No value filed.    Anesthesia Type:   General, LMA     Note:  Airway :Face Mask  Patient transferred to:PACU  Handoff Report: Identifed the Patient, Identified the Reponsible Provider, Reviewed the pertinent medical history, Discussed the surgical course, Reviewed Intra-OP anesthesia mangement and issues during anesthesia, Set expectations for post-procedure period and Allowed opportunity for questions and acknowledgement of understanding      Vitals: (Last set prior to Anesthesia Care Transfer)    CRNA VITALS  1/22/2019 0748 - 1/22/2019 0821      1/22/2019             Resp Rate (set):  10                Electronically Signed By: DAQUAN Fuentes CRNA  January 22, 2019  8:21 AM

## 2019-01-22 NOTE — DISCHARGE INSTRUCTIONS
Same Day Surgery Discharge Instructions for  Sedation and General Anesthesia       It's not unusual to feel dizzy, light-headed or faint for up to 24 hours after surgery or while taking pain medication.  If you have these symptoms: sit for a few minutes before standing and have someone assist you when you get up to walk or use the bathroom.      You should rest and relax for the next 24 hours. We recommend you make arrangements to have an adult stay with you for at least 24 hours after your discharge.  Avoid hazardous and strenuous activity.      DO NOT DRIVE any vehicle or operate mechanical equipment for 24 hours following the end of your surgery.  Even though you may feel normal, your reactions may be affected by the medication you have received.      Do not drink alcoholic beverages for 24 hours following surgery.       Slowly progress to your regular diet as you feel able. It's not unusual to feel nauseated and/or vomit after receiving anesthesia.  If you develop these symptoms, drink clear liquids (apple juice, ginger ale, broth, 7-up, etc. ) until you feel better.  If your nausea and vomiting persists for 24 hours, please notify your surgeon.        All narcotic pain medications, along with inactivity and anesthesia, can cause constipation. Drinking plenty of liquids and increasing fiber intake will help.      For any questions of a medical nature, call your surgeon.      Do not make important decisions for 24 hours.      If you had general anesthesia, you may have a sore throat for a couple of days related to the breathing tube used during surgery.  You may use Cepacol lozenges to help with this discomfort.  If it worsens or if you develop a fever, contact your surgeon.       If you feel your pain is not well managed with the pain medications prescribed by your surgeon, please contact your surgeon's office to let them know so they can address your concerns.         Reasons to contact your  surgeon:    1. Signs of possible infection: Check your incision daily for redness, swelling, warmth, red streaks or foul drainage.   2. Elevated temperature.  3. Pain not controlled with pain medication and/or rest.   4. Uncontrolled nausea or vomiting.  5. Any questions or concerns.            **If you have questions or concerns about your procedure,   call Dr. Mistry [phone]**

## 2019-01-23 LAB — COPATH REPORT: NORMAL

## 2019-01-28 ENCOUNTER — TRANSFERRED RECORDS (OUTPATIENT)
Dept: HEALTH INFORMATION MANAGEMENT | Facility: CLINIC | Age: 39
End: 2019-01-28

## 2019-01-29 ENCOUNTER — TRANSFERRED RECORDS (OUTPATIENT)
Dept: HEALTH INFORMATION MANAGEMENT | Facility: CLINIC | Age: 39
End: 2019-01-29

## 2019-02-05 ENCOUNTER — MEDICAL CORRESPONDENCE (OUTPATIENT)
Dept: HEALTH INFORMATION MANAGEMENT | Facility: CLINIC | Age: 39
End: 2019-02-05

## 2019-02-06 ENCOUNTER — TELEPHONE (OUTPATIENT)
Dept: ONCOLOGY | Facility: CLINIC | Age: 39
End: 2019-02-06

## 2019-02-06 NOTE — TELEPHONE ENCOUNTER
ONCOLOGY INTAKE: Records Information      APPT INFORMATION: 2/15/19 at 2:00PM  Referring provider:  Dr. Yana Morales  Referring provider s clinic:  [facility]  Reason for visit/diagnosis:  2nd Opinion of Hysterectomy, Endocervical Adenocarcinoma    Were the records received with the referral (via [employer])? No    Has patient been seen for any external appt for this diagnosis (enter clinic/location)? Yes - pt was seen at [facility]. We have received some records already scanned into the chart with the pt's referral from Dr. Herb Mistry at [facility] .    Please confirm where images and pathology slides currently are. Imaging and path reports have been received ([facility]) and scanned into chart on 12/14/18.

## 2019-02-14 NOTE — TELEPHONE ENCOUNTER
RECORDS STATUS - ALL OTHER DIAGNOSIS      RECORDS RECEIVED FROM: [facility] (in [facility]) & [facility] (in [facility])   DATE RECEIVED: 2/13/19   NOTES STATUS DETAILS   OFFICE NOTE from referring provider 2/5/19 [facility] - In [facility]     OFFICE NOTE from medical oncologist 1/29/19 [facility] - In Epic     DISCHARGE SUMMARY from hospital 1/22/19 In [facility]     DISCHARGE REPORT from the ER None None     OPERATIVE REPORT 1/22/19 Cold Knife Cone Bx In Epic     MEDICATION LIST 2/5/19 In [facility]     CLINICAL TRIAL TREATMENTS TO DATE None None     LABS     PATHOLOGY REPORTS 1/22/19 In Epic     ANYTHING RELATED TO DIAGNOSIS In Epic In Epic     GENONOMIC TESTING     TYPE: None None   IMAGING (NEED IMAGES & REPORT)     CT SCANS None None   MRI None None   MAMMO None None   ULTRASOUND 12/14/18 [facility]   PET 1/28/19 [facility]

## 2019-02-14 NOTE — PROGRESS NOTES
"GYN Oncology New Patient Consult    Referring provider:    Referred Self, MD  No address on file   RE: Delicia Zhou  : 1980  KANIKA: 2019      CC: cervical adenocarcinoma    HPI: Ms Delicia Zhou is a 38 year old year old  female who presents for consultation regarding cervical cancer. She is here today alone.    She notes a long history of abnormal pap smears. After BTL, did not have further followup (BTL was 8 years ago)     2018: heavy bleeding, got a pap smear (results not available)  2018; colposcopy  (results not available)   19: CKC with 1.3 x 0.8cm of invasive adenocarcinoma of the cervix with associated AIS. No LVSI. Tumor extends to deep margin (path report reviewed)   19: PET with hypermetabolic cervix. No evidence of mets. Indeterminant lung nodule.     Today she comes in to discuss treatment. She has previously seen a Gyn Onc and Rad Onc. She was offered surgery vs primary RT.   She is very concerned about needing a Beltran catheter and having chronic urinary problems. She reports not wanting to gain weight and is concerned that surgery might cause this. 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     Today she is not having any bleeding. No pain. She declines pelvic exam today.   She has anxiety and depression and ADD. She recently stopped her wellbutrin. We did not discuss the \"controlled substance agreement broken\" listed in her medical record.     Review of Systems:  Systemic:No weight changes.    Skin : No skin changes or new lesions.   Eye : No changes in vision.   Pulmonary: No cough or SOB.   Cardiovascular: No CP or palpitations  Gastrointestinal : No diarrhea, constipation, abdominal pain. Bowel habits normal.   Genitourinary: No dysuria, urgency. Had heavy bleeding which has now resolved.   Psychiatric: history of anxiety and " depression  Hematologic : No palpable lymph nodes.   Endocrine : No hot flashes. No heat/cold intolerance.      Neurological: No headaches, no numbness.     Past Medical History:   Diagnosis Date     Anxiety 2012    2x week- 3x week     Attention deficit disorder (ADD) without hyperactivity 2017     Controlled substance agreement broken 3/13/2018     Intermittent asthma     seasonal + using albuterol      Mild recurrent major depression (H) 2018       Past Surgical History:   Procedure Laterality Date     CONIZATION N/A 2019    Procedure: COLD KNIFE CONE BIOPSY;  Surgeon: Herb Mistry MD;  Location: [facility] OR     GYN SURGERY      TUBAL LIGATION     HC ENLARGE BREAST WITH IMPLANT  10/2005    Dr. Sunitha RUSHING history and Health Maintenance:    Last Pap Smear: long history of abnormal pap smears.   Last Mammogram:never  Last Colonoscopy: never     Current Outpatient Medications   Medication Sig Dispense Refill     albuterol (PROAIR HFA/PROVENTIL HFA/VENTOLIN HFA) 108 (90 BASE) MCG/ACT Inhaler Inhale 2 puffs into the lungs every 6 hours as needed for shortness of breath / dyspnea 1 Inhaler 6     amphetamine-dextroamphetamine (ADDERALL) 30 MG per tablet Take 1 tablet (30 mg) by mouth daily 30 tablet 0     ibuprofen (ADVIL/MOTRIN) 600 MG tablet Take 1 tablet (600 mg) by mouth every 6 hours as needed for other (mild and/or inflammatory pain) 30 tablet 0     LORazepam (ATIVAN) 1 MG tablet TK 1 T PO BID PRA  0     zolpidem (AMBIEN) 10 MG tablet               Allergies   Allergen Reactions     No Known Drug Allergies         Social History:  Social History     Tobacco Use     Smoking status: Never Smoker     Smokeless tobacco: Never Used   Substance Use Topics     Alcohol use: Yes     Comment: social     Work:  at [employer]  Ethnicity identification:   Preferred language: english  Lives at home with: 3 daughters    Family History:   The patient's family  "history is notable for:   Family History   Problem Relation Age of Onset     Cancer Father         unknown type. smoker         Physical Exam:     /84 (BP Location: Right arm, Patient Position: Chair, Cuff Size: Adult Regular)   Pulse 107   Temp 98  F (36.7  C) (Oral)   Resp 16   Ht 1.753 m (5' 9\")   Wt 66.9 kg (147 lb 6.4 oz)   SpO2 100%   Breastfeeding? No   BMI 21.77 kg/m    Body mass index is 21.77 kg/m .    General: Alert and oriented, no acute distress.   Psych: Somewhat pressured speech, but linear.  Uses language that makes it clear she is very frustrated with health care in general.   Remainder of exam deferred today per patient request    Assessment:    Delicia Zhou is a 38 year old woman with a new diagnosis of at least stage 1B1 cervical adenocarcinoma.       Plan:     1.)   Cervical cancer: reviewed etiology and natural history of cervical cancer. Discussed that this is caused by HPV. Reviewed that it is a slow growing process. Currently PET does not show any  Evidence of metastatic dz. In general for young women with early stage tumors I recommend radical hysterectomy with staging. We reviewed that primary chemo/RT is another option although associated with side effects such as vaginal narrowing and atrophy (also long term GI and  SEs). Reviewed treatment plan in general for chemoRT. We also discussed surgery at length. We discussed minimally invasive versus open technique. We reviewed the recent data demonstrating a significant increase in recurrence risk for minimally invasive surgery. We also discussed perioperative complication risks with open versus robotic approach. She is very concerned about missing work and recovery time given her social situation. She says that open surgery is \"not an option\". I told her that I do recommend the open approach, but am willing to perform robotic surgery for the perioperative benefits as long as she understands the increase risk of recurrence. " Specifically, there is a 3 year recurrence rate  In the entire cohort of 91 vs 97% (OS at 3 years ~94% vs 99%) .  Notably, tumors <2cm do have overall lower recurrence rates. I also feel primary chemoRT is an option but feel that the SE profile of vaginal brachytherapy is not ideal. I offered meeting with our radiation oncologists to discuss the approach further. Discussed surgical risks not limited to bleeding, infection, damage to surrounding organs, need for blood transfusions and ICU stay.  Discussed risks related to radical including chronic bladder, bowel and sexual dysfunction. Plan rodriguez for 5-7 days postop.     -She is currently undecided but leaning towards robotic surgery. I would plan a robotic radical hyst/removal of both tubes and sentinel lymph node dissection.   -Patient to followup next week for exam and ongoing discussion     2.) Genetic risk factors were assessed: final path pending    3.) Indeterminate lung nodule: Will plan a followup CT in 3-6 months    A total of 55 minutes was spent with the patient, 55 minutes of which were spent in counseling the patient and/or treatment planning.    Alexandra Michael MD    Department of Ob/Gyn and Women's Health  Division of Gynecologic Oncology  [facility]  [phone]

## 2019-02-15 ENCOUNTER — PRE VISIT (OUTPATIENT)
Dept: ONCOLOGY | Facility: CLINIC | Age: 39
End: 2019-02-15

## 2019-02-15 ENCOUNTER — ONCOLOGY VISIT (OUTPATIENT)
Dept: ONCOLOGY | Facility: CLINIC | Age: 39
End: 2019-02-15
Attending: OBSTETRICS & GYNECOLOGY
Payer: COMMERCIAL

## 2019-02-15 VITALS
BODY MASS INDEX: 21.83 KG/M2 | OXYGEN SATURATION: 100 % | DIASTOLIC BLOOD PRESSURE: 84 MMHG | HEART RATE: 107 BPM | SYSTOLIC BLOOD PRESSURE: 136 MMHG | HEIGHT: 69 IN | WEIGHT: 147.4 LBS | RESPIRATION RATE: 16 BRPM | TEMPERATURE: 98 F

## 2019-02-15 DIAGNOSIS — C53.1 MALIGNANT NEOPLASM OF EXOCERVIX (H): Primary | ICD-10-CM

## 2019-02-15 PROCEDURE — G0463 HOSPITAL OUTPT CLINIC VISIT: HCPCS | Mod: ZF

## 2019-02-15 PROCEDURE — 99204 OFFICE O/P NEW MOD 45 MIN: CPT | Mod: ZP | Performed by: OBSTETRICS & GYNECOLOGY

## 2019-02-15 RX ORDER — CEFAZOLIN SODIUM 2 G/50ML
2 SOLUTION INTRAVENOUS
Status: CANCELLED | OUTPATIENT
Start: 2019-02-15

## 2019-02-15 RX ORDER — CEFAZOLIN SODIUM 1 G/50ML
1 INJECTION, SOLUTION INTRAVENOUS SEE ADMIN INSTRUCTIONS
Status: CANCELLED | OUTPATIENT
Start: 2019-02-15

## 2019-02-15 RX ORDER — ZOLPIDEM TARTRATE 10 MG/1
TABLET ORAL
Status: ON HOLD | COMMUNITY
Start: 2019-01-29 | End: 2019-02-26

## 2019-02-15 RX ORDER — LORAZEPAM 1 MG/1
TABLET ORAL
Refills: 0 | COMMUNITY
Start: 2019-01-25 | End: 2019-03-01

## 2019-02-15 ASSESSMENT — MIFFLIN-ST. JEOR: SCORE: 1412.98

## 2019-02-15 ASSESSMENT — PAIN SCALES - GENERAL: PAINLEVEL: NO PAIN (0)

## 2019-02-15 NOTE — LETTER
"2/15/2019     RE: Delicia Zhou  [address] [place] [address]     Dear Colleague,    Thank you for referring your patient, Delicia Zhou, to the [facility]. Please see a copy of my visit note below.    GYN Oncology New Patient Consult    Referring provider:    Referred Self, MD  No address on file   RE: Delicia Zhou  : 1980  KANIKA: 2019      CC: cervical adenocarcinoma    HPI: Ms Delicia Zhou is a 38 year old year old  female who presents for consultation regarding cervical cancer. She is here today alone.    She notes a long history of abnormal pap smears. After BTL, did not have further followup (BTL was 8 years ago)     2018: heavy bleeding, got a pap smear (results not available)  2018; colposcopy  (results not available)   19: CKC with 1.3 x 0.8cm of invasive adenocarcinoma of the cervix with associated AIS. No LVSI. Tumor extends to deep margin (path report reviewed)   19: PET with hypermetabolic cervix. No evidence of mets. Indeterminant lung nodule.     Today she comes in to discuss treatment. She has previously seen a Gyn Onc and Rad Onc. She was offered surgery vs primary RT.   She is very concerned about needing a Beltran catheter and having chronic urinary problems. She reports not wanting to gain weight and is concerned that surgery might cause this. 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     Today she is not having any bleeding. No pain. She declines pelvic exam today.   She has anxiety and depression and ADD. She recently stopped her wellbutrin. We did not discuss the \"controlled substance agreement broken\" listed in her medical record.     Review of Systems:  Systemic:No weight changes.    Skin : No skin changes or new lesions.   Eye : No changes in vision.   Pulmonary: No cough or SOB.   Cardiovascular: No " CP or palpitations  Gastrointestinal : No diarrhea, constipation, abdominal pain. Bowel habits normal.   Genitourinary: No dysuria, urgency. Had heavy bleeding which has now resolved.   Psychiatric: history of anxiety and depression  Hematologic : No palpable lymph nodes.   Endocrine : No hot flashes. No heat/cold intolerance.      Neurological: No headaches, no numbness.     Past Medical History:   Diagnosis Date     Anxiety 2012    2x week- 3x week     Attention deficit disorder (ADD) without hyperactivity 2017     Controlled substance agreement broken 3/13/2018     Intermittent asthma     seasonal + using albuterol      Mild recurrent major depression (H) 2018       Past Surgical History:   Procedure Laterality Date     CONIZATION N/A 2019    Procedure: COLD KNIFE CONE BIOPSY;  Surgeon: Herb Mistry MD;  Location: [facility]     GYN SURGERY      TUBAL LIGATION     HC ENLARGE BREAST WITH IMPLANT  10/2005    Dr. Sunitha RUSHING history and Health Maintenance:    Last Pap Smear: long history of abnormal pap smears.   Last Mammogram:never  Last Colonoscopy: never     Current Outpatient Medications   Medication Sig Dispense Refill     albuterol (PROAIR HFA/PROVENTIL HFA/VENTOLIN HFA) 108 (90 BASE) MCG/ACT Inhaler Inhale 2 puffs into the lungs every 6 hours as needed for shortness of breath / dyspnea 1 Inhaler 6     amphetamine-dextroamphetamine (ADDERALL) 30 MG per tablet Take 1 tablet (30 mg) by mouth daily 30 tablet 0     ibuprofen (ADVIL/MOTRIN) 600 MG tablet Take 1 tablet (600 mg) by mouth every 6 hours as needed for other (mild and/or inflammatory pain) 30 tablet 0     LORazepam (ATIVAN) 1 MG tablet TK 1 T PO BID PRA  0     zolpidem (AMBIEN) 10 MG tablet           Allergies   Allergen Reactions     No Known Drug Allergies         Social History:  Social History     Tobacco Use     Smoking status: Never Smoker     Smokeless tobacco: Never Used   Substance Use Topics     Alcohol  "use: Yes     Comment: social     Work:  at car dealership  Ethnicity identification:   Preferred language: english  Lives at home with: 3 daughters    Family History:   The patient's family history is notable for:   Family History   Problem Relation Age of Onset     Cancer Father         unknown type. smoker         Physical Exam:     /84 (BP Location: Right arm, Patient Position: Chair, Cuff Size: Adult Regular)   Pulse 107   Temp 98  F (36.7  C) (Oral)   Resp 16   Ht 1.753 m (5' 9\")   Wt 66.9 kg (147 lb 6.4 oz)   SpO2 100%   Breastfeeding? No   BMI 21.77 kg/m     Body mass index is 21.77 kg/m .    General: Alert and oriented, no acute distress.   Psych: Somewhat pressured speech, but linear.  Uses language that makes it clear she is very frustrated with health care in general.   Remainder of exam deferred today per patient request    Assessment:    Delicia Zhou is a 38 year old woman with a new diagnosis of at least stage 1B1 cervical adenocarcinoma.       Plan:     1.)   Cervical cancer: reviewed etiology and natural history of cervical cancer. Discussed that this is caused by HPV. Reviewed that it is a slow growing process. Currently PET does not show any  Evidence of metastatic dz. In general for young women with early stage tumors I recommend radical hysterectomy with staging. We reviewed that primary chemo/RT is another option although associated with side effects such as vaginal narrowing and atrophy (also long term GI and  SEs). Reviewed treatment plan in general for chemoRT. We also discussed surgery at length. We discussed minimally invasive versus open technique. We reviewed the recent data demonstrating a significant increase in recurrence risk for minimally invasive surgery. We also discussed perioperative complication risks with open versus robotic approach. She is very concerned about missing work and recovery time given her social situation. She says that " "open surgery is \"not an option\". I told her that I do recommend the open approach, but am willing to perform robotic surgery for the perioperative benefits as long as she understands the increase risk of recurrence. Specifically, there is a 3 year recurrence rate  In the entire cohort of 91 vs 97% (OS at 3 years ~94% vs 99%) .  Notably, tumors <2cm do have overall lower recurrence rates. I also feel primary chemoRT is an option but feel that the SE profile of vaginal brachytherapy is not ideal. I offered meeting with our radiation oncologists to discuss the approach further. Discussed surgical risks not limited to bleeding, infection, damage to surrounding organs, need for blood transfusions and ICU stay.  Discussed risks related to radical including chronic bladder, bowel and sexual dysfunction. Plan rodriguez for 5-7 days postop.     -She is currently undecided but leaning towards robotic surgery. I would plan a robotic radical hyst/removal of both tubes and sentinel lymph node dissection.   -Patient to followup next week for exam and ongoing discussion     2.) Genetic risk factors were assessed: final path pending    3.) Indeterminate lung nodule: Will plan a followup CT in 3-6 months    A total of 55 minutes was spent with the patient, 55 minutes of which were spent in counseling the patient and/or treatment planning.    Alexandra Michael MD    Department of Ob/Gyn and Women's Health  Division of Gynecologic Oncology  [facility]  [phone]    "

## 2019-02-15 NOTE — TELEPHONE ENCOUNTER
2/15/19 @ 11:43 Ultrasound disc received from [facility] running over to radiology for urgent scan.

## 2019-02-15 NOTE — NURSING NOTE
"Oncology Rooming Note    February 15, 2019 2:10 PM   Delicia Zhou is a 38 year old female who presents for:    Chief Complaint   Patient presents with     New Patient     New pt; 2nd opinion for hysterectomy; Invasive endocervical adencarcinoma; vitals completed by NERY     Initial Vitals: /84 (BP Location: Right arm, Patient Position: Chair, Cuff Size: Adult Regular)   Pulse 107   Temp 98  F (36.7  C) (Oral)   Resp 16   Ht 1.753 m (5' 9\")   Wt 66.9 kg (147 lb 6.4 oz)   SpO2 100%   Breastfeeding? No   BMI 21.77 kg/m   Estimated body mass index is 21.77 kg/m  as calculated from the following:    Height as of this encounter: 1.753 m (5' 9\").    Weight as of this encounter: 66.9 kg (147 lb 6.4 oz). Body surface area is 1.8 meters squared.  No Pain (0) Comment: Data Unavailable   No LMP recorded.  Allergies reviewed: Yes  Medications reviewed: Yes    Medications: Medication refills not needed today.  Pharmacy name entered into EPIC:    [facility] [address] [place] [address] AT [facility] OF  [address]  [facility] [address] [place] [address]    Clinical concerns: No new concerns today  Dr. Michael  was NOT notified.    10 minutes for nursing intake (face to face time)     Carolee Morales              "

## 2019-02-15 NOTE — TELEPHONE ENCOUNTER
Spoke with Ciarra at [facility] [place] she pushed PET Scan to Pacs    @ 846 Left VM with Anita at [facility] about getting CT scans pushed to PACS now.

## 2019-02-18 ENCOUNTER — TELEPHONE (OUTPATIENT)
Dept: ONCOLOGY | Facility: CLINIC | Age: 39
End: 2019-02-18

## 2019-02-19 ENCOUNTER — ONCOLOGY VISIT (OUTPATIENT)
Dept: ONCOLOGY | Facility: CLINIC | Age: 39
End: 2019-02-19
Attending: OBSTETRICS & GYNECOLOGY
Payer: COMMERCIAL

## 2019-02-19 VITALS
WEIGHT: 150 LBS | OXYGEN SATURATION: 100 % | DIASTOLIC BLOOD PRESSURE: 83 MMHG | HEART RATE: 110 BPM | SYSTOLIC BLOOD PRESSURE: 138 MMHG | BODY MASS INDEX: 22.15 KG/M2

## 2019-02-19 DIAGNOSIS — C53.1 MALIGNANT NEOPLASM OF EXOCERVIX (H): Primary | ICD-10-CM

## 2019-02-19 PROCEDURE — 99215 OFFICE O/P EST HI 40 MIN: CPT | Mod: ZP | Performed by: OBSTETRICS & GYNECOLOGY

## 2019-02-19 PROCEDURE — G0463 HOSPITAL OUTPT CLINIC VISIT: HCPCS | Mod: ZF

## 2019-02-19 ASSESSMENT — PAIN SCALES - GENERAL: PAINLEVEL: NO PAIN (0)

## 2019-02-19 NOTE — NURSING NOTE
"Oncology Rooming Note    February 19, 2019 11:22 AM   Delicia Zhou is a 38 year old female who presents for:    Chief Complaint   Patient presents with     Oncology Clinic Visit     Initial Vitals: /83   Pulse 110   Wt 68 kg (150 lb)   SpO2 100%   BMI 22.15 kg/m   Estimated body mass index is 22.15 kg/m  as calculated from the following:    Height as of 2/15/19: 1.753 m (5' 9\").    Weight as of this encounter: 68 kg (150 lb). Body surface area is 1.82 meters squared.  No Pain (0) Comment: Data Unavailable   No LMP recorded.  Allergies reviewed: Yes  Medications reviewed: Yes    Medications: Medication refills not needed today.  Pharm[facility] name entered into EPIC:    [id number] [place] [address] [address]  [facility] [place] [facility] - [place] [address]      Ale Mccurdy CMA              "

## 2019-02-19 NOTE — LETTER
"2019       RE: Delicia Zhou  [address] [place] [address]     Dear Colleague,    Thank you for referring your patient, Delicia Zhou, to the [facility]. Please see a copy of my visit note below.    GYN Oncology New Patient Consult    Referring provider:    Referred Self, MD  No address on file   RE: Delicia Zhou  : 1980  KANIKA: 2019     CC: cervical adenocarcinoma    HPI: Ms Delicia Zhou is a 38 year old year old  female who presents for consultation regarding cervical cancer. She is here today alone.    Treatment History:   She notes a long history of abnormal pap smears. After BTL, did not have further followup (BTL was 8 years ago)   2018: heavy bleeding, got a pap smear (results not available)  2018; colposcopy  (results not available)   19: CKC with 1.3 x 0.8cm of invasive adenocarcinoma of the cervix with associated AIS. No LVSI. Tumor extends to deep margin (path report reviewed)   19: PET with hypermetabolic cervix. No evidence of mets. Indeterminant lung nodule.     Today she comes in again to discuss treatment.   She is still very concerned about needing a Beltran catheter and having chronic urinary problems. 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 She is willing to take off one week of work.     Today she is not having any bleeding. No pain.     She has anxiety and depression and ADD. She recently stopped her wellbutrin. We did not discuss the \"controlled substance agreement broken\" listed in her medical record.      Review of Systems:  Systemic:No weight changes.    Skin : No skin changes or new lesions.   Eye : No changes in vision.   Pulmonary: No cough or SOB.   Cardiovascular: No CP or palpitations  Gastrointestinal : No diarrhea, constipation, abdominal pain. Bowel habits normal.   Genitourinary: No dysuria, " urgency. Had heavy bleeding which has now resolved.   Psychiatric: history of anxiety and depression  Hematologic : No palpable lymph nodes.   Endocrine : No hot flashes. No heat/cold intolerance.      Neurological: No headaches, no numbness.     Past Medical History:   Diagnosis Date     Anxiety 2012    2x week- 3x week     Attention deficit disorder (ADD) without hyperactivity 2017     Controlled substance agreement broken 3/13/2018     Intermittent asthma     seasonal + using albuterol      Mild recurrent major depression (H) 2018       Past Surgical History:   Procedure Laterality Date     CONIZATION N/A 2019    Procedure: COLD KNIFE CONE BIOPSY;  Surgeon: Herb Mistry MD;  Location: [facility]     GYN SURGERY      TUBAL LIGATION     HC ENLARGE BREAST WITH IMPLANT  10/2005    Dr. Sunitha RUSHING history and Health Maintenance:    Last Pap Smear: long history of abnormal pap smears.   Last Mammogram:never  Last Colonoscopy: never     Current Outpatient Medications   Medication Sig Dispense Refill     albuterol (PROAIR HFA/PROVENTIL HFA/VENTOLIN HFA) 108 (90 BASE) MCG/ACT Inhaler Inhale 2 puffs into the lungs every 6 hours as needed for shortness of breath / dyspnea 1 Inhaler 6     amphetamine-dextroamphetamine (ADDERALL) 30 MG per tablet Take 1 tablet (30 mg) by mouth daily 30 tablet 0     ibuprofen (ADVIL/MOTRIN) 600 MG tablet Take 1 tablet (600 mg) by mouth every 6 hours as needed for other (mild and/or inflammatory pain) 30 tablet 0     LORazepam (ATIVAN) 1 MG tablet TK 1 T PO BID PRA  0     zolpidem (AMBIEN) 10 MG tablet          Allergies   Allergen Reactions     No Known Drug Allergies         Social History:  Social History     Tobacco Use     Smoking status: Never Smoker     Smokeless tobacco: Never Used   Substance Use Topics     Alcohol use: Yes     Comment: social     Work:  at [employer]  Ethnicity identification:   Preferred language:  english  Lives at home with: 3 daughters    Family History:   The patient's family history is notable for:   Family History   Problem Relation Age of Onset     Cancer Father         unknown type. smoker       Physical Exam:     /83   Pulse 110   Wt 68 kg (150 lb)   SpO2 100%   BMI 22.15 kg/m     Body mass index is 22.15 kg/m .    General: Alert and oriented, no acute distress.   Psych: Somewhat pressured speech, but linear.  Uses language that makes it clear she is very frustrated with health care in general.   Remainder of exam deferred today per patient request    Assessment:    Delicia Zhou is a 38 year old woman with a new diagnosis of at least stage 1B1 cervical adenocarcinoma.     Plan:     1.)   Cervical cancer: Again, reviewed etiology and natural history of cervical cancer. Discussed that this is caused by HPV. Reviewed that it is a slow growing process. Currently PET does not show any  Evidence of metastatic dz. See my previous note for discussion about RT versus srugery.     We discussed surgery again today. Standard surgery would be an open radical hysterectomy. She adamantly refused this given the increased recovery time. She voiced a good understanding of the recent research showing an increased risk of recurrence. We discussed robotic surgery. I recommend robotic radical/bilateral salpingectomy and sentinel lymph nodes. . Discussed surgical risks not limited to bleeding, infection, damage to surrounding organs, need for blood transfusions and ICU stay. Discussed that if open surgery occurs, she WILL need more time off of work Discussed risks related to radical including chronic bladder, bowel and sexual dysfunction. Plan rodriguez for 5-7 days postop. She would like to try a trial of void the day after surgery which I think is reasonable. I am recommending up to 6 weeks off, possible return to work 3-4 weeks postop. She would like to return to work one week postop. I am willing to see her POD7  to evaluate for early return to work.     -Plan robotic radical hyst/removal of both tubes/sentinel lymph node biopsies. Consents signed today  -Plan to keep overnight  -Plan voiding trial POD1. If she fails, would plan a voiding trial Friday after surgery (Macrobid ppx while catheter is in)  -Plan to followup March 5th for postop  -Labs today    2.) Genetic risk factors were assessed: final path pending    3.) Indeterminate lung nodule: Will plan a followup CT in 3-6 months    A total of 45 minutes was spent with the patient, 40 minutes of which were spent in counseling the patient and/or treatment planning.    Alexandra Michael MD    Department of Ob/Gyn and Women's Health  Division of Gynecologic Oncology  [facility]  [phone]

## 2019-02-19 NOTE — PROGRESS NOTES
"GYN Oncology New Patient Consult    Referring provider:    Referred Self, MD  No address on file   RE: Delicia Zhou  : 1980  KANIKA: 2019       CC: cervical adenocarcinoma    HPI: Ms Delicia Zhou is a 38 year old year old  female who presents for consultation regarding cervical cancer. She is here today alone.        Treatment History:   She notes a long history of abnormal pap smears. After BTL, did not have further followup (BTL was 8 years ago)   2018: heavy bleeding, got a pap smear (results not available)  2018; colposcopy  (results not available)   19: CKC with 1.3 x 0.8cm of invasive adenocarcinoma of the cervix with associated AIS. No LVSI. Tumor extends to deep margin (path report reviewed)   19: PET with hypermetabolic cervix. No evidence of mets. Indeterminant lung nodule.     Today she comes in again to discuss treatment.   She is still very concerned about needing a Beltran catheter and having chronic urinary problems. 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 She is willing to take off one week of work.     Today she is not having any bleeding. No pain.     She has anxiety and depression and ADD. She recently stopped her wellbutrin. We did not discuss the \"controlled substance agreement broken\" listed in her medical record.        Review of Systems:  Systemic:No weight changes.    Skin : No skin changes or new lesions.   Eye : No changes in vision.   Pulmonary: No cough or SOB.   Cardiovascular: No CP or palpitations  Gastrointestinal : No diarrhea, constipation, abdominal pain. Bowel habits normal.   Genitourinary: No dysuria, urgency. Had heavy bleeding which has now resolved.   Psychiatric: history of anxiety and depression  Hematologic : No palpable lymph nodes.   Endocrine : No hot flashes. No heat/cold intolerance.      Neurological: No headaches, no " numbness.     Past Medical History:   Diagnosis Date     Anxiety 2012    2x week- 3x week     Attention deficit disorder (ADD) without hyperactivity 2017     Controlled substance agreement broken 3/13/2018     Intermittent asthma     seasonal + using albuterol      Mild recurrent major depression (H) 2018       Past Surgical History:   Procedure Laterality Date     CONIZATION N/A 2019    Procedure: COLD KNIFE CONE BIOPSY;  Surgeon: Herb Mistry MD;  Location: [facility]     GYN SURGERY      TUBAL LIGATION     HC ENLARGE BREAST WITH IMPLANT  10/2005    Dr. Sunitha RUSHING history and Health Maintenance:    Last Pap Smear: long history of abnormal pap smears.   Last Mammogram:never  Last Colonoscopy: never     Current Outpatient Medications   Medication Sig Dispense Refill     albuterol (PROAIR HFA/PROVENTIL HFA/VENTOLIN HFA) 108 (90 BASE) MCG/ACT Inhaler Inhale 2 puffs into the lungs every 6 hours as needed for shortness of breath / dyspnea 1 Inhaler 6     amphetamine-dextroamphetamine (ADDERALL) 30 MG per tablet Take 1 tablet (30 mg) by mouth daily 30 tablet 0     ibuprofen (ADVIL/MOTRIN) 600 MG tablet Take 1 tablet (600 mg) by mouth every 6 hours as needed for other (mild and/or inflammatory pain) 30 tablet 0     LORazepam (ATIVAN) 1 MG tablet TK 1 T PO BID PRA  0     zolpidem (AMBIEN) 10 MG tablet               Allergies   Allergen Reactions     No Known Drug Allergies         Social History:  Social History     Tobacco Use     Smoking status: Never Smoker     Smokeless tobacco: Never Used   Substance Use Topics     Alcohol use: Yes     Comment: social     Work:  at car dealership  Ethnicity identification:   Preferred language: english  Lives at home with: 3 daughters    Family History:   The patient's family history is notable for:   Family History   Problem Relation Age of Onset     Cancer Father         unknown type. smoker         Physical Exam:     BP  138/83   Pulse 110   Wt 68 kg (150 lb)   SpO2 100%   BMI 22.15 kg/m    Body mass index is 22.15 kg/m .    General: Alert and oriented, no acute distress.   Psych: Somewhat pressured speech, but linear.  Uses language that makes it clear she is very frustrated with health care in general.   Remainder of exam deferred today per patient request    Assessment:    Delicia Zhou is a 38 year old woman with a new diagnosis of at least stage 1B1 cervical adenocarcinoma.       Plan:     1.)   Cervical cancer: Again, reviewed etiology and natural history of cervical cancer. Discussed that this is caused by HPV. Reviewed that it is a slow growing process. Currently PET does not show any  Evidence of metastatic dz. See my previous note for discussion about RT versus srugery.     We discussed surgery again today. Standard surgery would be an open radical hysterectomy. She adamantly refused this given the increased recovery time. She voiced a good understanding of the recent research showing an increased risk of recurrence. We discussed robotic surgery. I recommend robotic radical/bilateral salpingectomy and sentinel lymph nodes. . Discussed surgical risks not limited to bleeding, infection, damage to surrounding organs, need for blood transfusions and ICU stay. Discussed that if open surgery occurs, she WILL need more time off of work Discussed risks related to radical including chronic bladder, bowel and sexual dysfunction. Plan rodriguez for 5-7 days postop. She would like to try a trial of void the day after surgery which I think is reasonable. I am recommending up to 6 weeks off, possible return to work 3-4 weeks postop. She would like to return to work one week postop. I am willing to see her POD7 to evaluate for early return to work.     -Plan robotic radical hyst/removal of both tubes/sentinel lymph node biopsies. Consents signed today  -Plan to keep overnight  -Plan voiding trial POD1. If she fails, would plan a voiding  trial Friday after surgery (Macrobid ppx while catheter is in)  -Plan to followup March 5th for postop  -Labs today    2.) Genetic risk factors were assessed: final path pending    3.) Indeterminate lung nodule: Will plan a followup CT in 3-6 months    A total of 45 minutes was spent with the patient, 40 minutes of which were spent in counseling the patient and/or treatment planning.    Alexandra Michael MD    Department of Ob/Gyn and Women's Health  Division of Gynecologic Oncology  [facility]  [phone]

## 2019-02-19 NOTE — NURSING NOTE
Pre Op Nurse Teaching Template    Relevant Diagnosis: Cervical cancer    Teaching Topic: Robotic assisted radical hysterectomy, removal of both fallopian tubes, sentinal lymph node biopsies     Person(s) involved in teaching :  Patient  Alone  Motivation Level:  Asks Questions:    Yes      Eager to Learn:     Yes     Cooperative:          Yes    Receptive (willing. Able to accept information):    Yes      Patient and those who are listed above demonstrates understanding of the following:   Reason for the appointment, diagnosis and treatment plan:   Yes   Knowledge of proper use of medications and conditions for which they are ordered (with special attention to potential side effects or drug interactions): Yes   Which situations necessitate calling provider and whom to contact: Yes         Nutritional needs and diet plan:  Yes      Pain management techniques:     Yes, Pain Scale   Diet:   Yes, [facility] Diet Instructions    Teaching Concerns addressed: Yes    Infection Prevention:  Patient and those who are listed above demonstrate understanding of the following:  Pre-Op CHG Bathing Instructions: Yes  Surgical procedure site care taught:   Yes   Signs and symptoms of infection taught: Yes       Instructional Materials Used/Given:  The [place] Before You Surgery Booklet  Showering or Bathing before Surgery Instructions & CHG Product  Hysterectomy Guidelines  Pain Assessment Tool   Home Care after Major Abdominal or Vaginal Surgery  Map  Accommodations Brochure  Phone numbers for [facility] and [facility]  Copy of Surgical Consent    Comments:  TONYA Redding RN

## 2019-02-22 ENCOUNTER — ANESTHESIA EVENT (OUTPATIENT)
Dept: SURGERY | Facility: CLINIC | Age: 39
End: 2019-02-22
Payer: COMMERCIAL

## 2019-02-25 ENCOUNTER — ANESTHESIA (OUTPATIENT)
Dept: SURGERY | Facility: CLINIC | Age: 39
End: 2019-02-25
Payer: COMMERCIAL

## 2019-02-25 ENCOUNTER — HOSPITAL ENCOUNTER (OUTPATIENT)
Facility: CLINIC | Age: 39
Discharge: HOME | End: 2019-02-26
Attending: OBSTETRICS & GYNECOLOGY | Admitting: OBSTETRICS & GYNECOLOGY
Payer: COMMERCIAL

## 2019-02-25 ENCOUNTER — SURGERY (OUTPATIENT)
Age: 39
End: 2019-02-25
Payer: COMMERCIAL

## 2019-02-25 DIAGNOSIS — Z90.710: Primary | ICD-10-CM

## 2019-02-25 DIAGNOSIS — C53.1 MALIGNANT NEOPLASM OF EXOCERVIX (H): ICD-10-CM

## 2019-02-25 LAB
ABO + RH BLD: NORMAL
ABO + RH BLD: NORMAL
BLD GP AB SCN SERPL QL: NORMAL
BLOOD BANK CMNT PATIENT-IMP: NORMAL
BLOOD BANK CMNT PATIENT-IMP: NORMAL
GLUCOSE BLDC GLUCOMTR-MCNC: 100 MG/DL (ref 70–99)
HCG UR QL: NEGATIVE
SPECIMEN EXP DATE BLD: NORMAL

## 2019-02-25 PROCEDURE — 38571 LAPAROSCOPY LYMPHADENECTOMY: CPT | Mod: 51 | Performed by: OBSTETRICS & GYNECOLOGY

## 2019-02-25 PROCEDURE — 25000128 H RX IP 250 OP 636: Performed by: ANESTHESIOLOGY

## 2019-02-25 PROCEDURE — 25000125 ZZHC RX 250: Performed by: OBSTETRICS & GYNECOLOGY

## 2019-02-25 PROCEDURE — 25000125 ZZHC RX 250: Performed by: NURSE ANESTHETIST, CERTIFIED REGISTERED

## 2019-02-25 PROCEDURE — 25000566 ZZH SEVOFLURANE, EA 15 MIN: Performed by: OBSTETRICS & GYNECOLOGY

## 2019-02-25 PROCEDURE — 86850 RBC ANTIBODY SCREEN: CPT | Performed by: ANESTHESIOLOGY

## 2019-02-25 PROCEDURE — 36000088 ZZH SURGERY LEVEL 8 EA 15 ADDTL MIN - UMMC: Performed by: OBSTETRICS & GYNECOLOGY

## 2019-02-25 PROCEDURE — C9290 INJ, BUPIVACAINE LIPOSOME: HCPCS | Performed by: ANESTHESIOLOGY

## 2019-02-25 PROCEDURE — 25000128 H RX IP 250 OP 636: Performed by: OBSTETRICS & GYNECOLOGY

## 2019-02-25 PROCEDURE — 25000565 ZZH ISOFLURANE, EA 15 MIN: Performed by: OBSTETRICS & GYNECOLOGY

## 2019-02-25 PROCEDURE — 58571 TLH W/T/O 250 G OR LESS: CPT | Performed by: OBSTETRICS & GYNECOLOGY

## 2019-02-25 PROCEDURE — 82962 GLUCOSE BLOOD TEST: CPT

## 2019-02-25 PROCEDURE — 25800030 ZZH RX IP 258 OP 636: Performed by: NURSE ANESTHETIST, CERTIFIED REGISTERED

## 2019-02-25 PROCEDURE — 36000086 ZZH SURGERY LEVEL 8 1ST 30 MIN UMMC: Performed by: OBSTETRICS & GYNECOLOGY

## 2019-02-25 PROCEDURE — 88307 TISSUE EXAM BY PATHOLOGIST: CPT | Performed by: OBSTETRICS & GYNECOLOGY

## 2019-02-25 PROCEDURE — 71000015 ZZH RECOVERY PHASE 1 LEVEL 2 EA ADDTL HR: Performed by: OBSTETRICS & GYNECOLOGY

## 2019-02-25 PROCEDURE — 81025 URINE PREGNANCY TEST: CPT | Performed by: ANESTHESIOLOGY

## 2019-02-25 PROCEDURE — 25000128 H RX IP 250 OP 636

## 2019-02-25 PROCEDURE — 25000132 ZZH RX MED GY IP 250 OP 250 PS 637: Performed by: STUDENT IN AN ORGANIZED HEALTH CARE EDUCATION/TRAINING PROGRAM

## 2019-02-25 PROCEDURE — 25000128 H RX IP 250 OP 636: Performed by: NURSE ANESTHETIST, CERTIFIED REGISTERED

## 2019-02-25 PROCEDURE — 86901 BLOOD TYPING SEROLOGIC RH(D): CPT | Performed by: ANESTHESIOLOGY

## 2019-02-25 PROCEDURE — 40000171 ZZH STATISTIC PRE-PROCEDURE ASSESSMENT III: Performed by: OBSTETRICS & GYNECOLOGY

## 2019-02-25 PROCEDURE — 27210794 ZZH OR GENERAL SUPPLY STERILE: Performed by: OBSTETRICS & GYNECOLOGY

## 2019-02-25 PROCEDURE — 71000014 ZZH RECOVERY PHASE 1 LEVEL 2 FIRST HR: Performed by: OBSTETRICS & GYNECOLOGY

## 2019-02-25 PROCEDURE — 37000008 ZZH ANESTHESIA TECHNICAL FEE, 1ST 30 MIN: Performed by: OBSTETRICS & GYNECOLOGY

## 2019-02-25 PROCEDURE — 25800030 ZZH RX IP 258 OP 636: Performed by: ANESTHESIOLOGY

## 2019-02-25 PROCEDURE — 88309 TISSUE EXAM BY PATHOLOGIST: CPT | Performed by: OBSTETRICS & GYNECOLOGY

## 2019-02-25 PROCEDURE — 37000009 ZZH ANESTHESIA TECHNICAL FEE, EACH ADDTL 15 MIN: Performed by: OBSTETRICS & GYNECOLOGY

## 2019-02-25 PROCEDURE — 86900 BLOOD TYPING SEROLOGIC ABO: CPT | Performed by: ANESTHESIOLOGY

## 2019-02-25 RX ORDER — FENTANYL CITRATE 50 UG/ML
25-50 INJECTION, SOLUTION INTRAMUSCULAR; INTRAVENOUS
Status: DISCONTINUED | OUTPATIENT
Start: 2019-02-25 | End: 2019-02-25

## 2019-02-25 RX ORDER — OXYCODONE HYDROCHLORIDE 5 MG/1
5-10 TABLET ORAL
Qty: 15 TABLET | Refills: 0 | Status: SHIPPED | OUTPATIENT
Start: 2019-02-25 | End: 2019-02-26

## 2019-02-25 RX ORDER — PROPOFOL 10 MG/ML
INJECTION, EMULSION INTRAVENOUS PRN
Status: DISCONTINUED | OUTPATIENT
Start: 2019-02-25 | End: 2019-02-25

## 2019-02-25 RX ORDER — NALOXONE HYDROCHLORIDE 0.4 MG/ML
.1-.4 INJECTION, SOLUTION INTRAMUSCULAR; INTRAVENOUS; SUBCUTANEOUS
Status: DISCONTINUED | OUTPATIENT
Start: 2019-02-25 | End: 2019-02-25 | Stop reason: HOSPADM

## 2019-02-25 RX ORDER — FENTANYL CITRATE 50 UG/ML
INJECTION, SOLUTION INTRAMUSCULAR; INTRAVENOUS PRN
Status: DISCONTINUED | OUTPATIENT
Start: 2019-02-25 | End: 2019-02-25

## 2019-02-25 RX ORDER — BUPIVACAINE HYDROCHLORIDE 2.5 MG/ML
INJECTION, SOLUTION EPIDURAL; INFILTRATION; INTRACAUDAL PRN
Status: DISCONTINUED | OUTPATIENT
Start: 2019-02-25 | End: 2019-02-25

## 2019-02-25 RX ORDER — IBUPROFEN 600 MG/1
600 TABLET, FILM COATED ORAL EVERY 6 HOURS PRN
Qty: 30 TABLET | Refills: 0 | Status: SHIPPED | OUTPATIENT
Start: 2019-02-25 | End: 2019-03-27

## 2019-02-25 RX ORDER — NALOXONE HYDROCHLORIDE 0.4 MG/ML
.1-.4 INJECTION, SOLUTION INTRAMUSCULAR; INTRAVENOUS; SUBCUTANEOUS
Status: DISCONTINUED | OUTPATIENT
Start: 2019-02-25 | End: 2019-02-25

## 2019-02-25 RX ORDER — ONDANSETRON 2 MG/ML
4 INJECTION INTRAMUSCULAR; INTRAVENOUS EVERY 6 HOURS PRN
Status: DISCONTINUED | OUTPATIENT
Start: 2019-02-25 | End: 2019-02-26 | Stop reason: HOSPADM

## 2019-02-25 RX ORDER — SODIUM CHLORIDE, SODIUM LACTATE, POTASSIUM CHLORIDE, CALCIUM CHLORIDE 600; 310; 30; 20 MG/100ML; MG/100ML; MG/100ML; MG/100ML
INJECTION, SOLUTION INTRAVENOUS CONTINUOUS
Status: DISCONTINUED | OUTPATIENT
Start: 2019-02-25 | End: 2019-02-25

## 2019-02-25 RX ORDER — FLUMAZENIL 0.1 MG/ML
0.2 INJECTION, SOLUTION INTRAVENOUS
Status: DISCONTINUED | OUTPATIENT
Start: 2019-02-25 | End: 2019-02-25 | Stop reason: HOSPADM

## 2019-02-25 RX ORDER — LORAZEPAM 2 MG/ML
1 INJECTION INTRAMUSCULAR ONCE
Status: DISCONTINUED | OUTPATIENT
Start: 2019-02-25 | End: 2019-02-25

## 2019-02-25 RX ORDER — ONDANSETRON 4 MG/1
4 TABLET, ORALLY DISINTEGRATING ORAL EVERY 30 MIN PRN
Status: DISCONTINUED | OUTPATIENT
Start: 2019-02-25 | End: 2019-02-25

## 2019-02-25 RX ORDER — NALOXONE HYDROCHLORIDE 0.4 MG/ML
.1-.4 INJECTION, SOLUTION INTRAMUSCULAR; INTRAVENOUS; SUBCUTANEOUS
Status: DISCONTINUED | OUTPATIENT
Start: 2019-02-25 | End: 2019-02-26 | Stop reason: HOSPADM

## 2019-02-25 RX ORDER — FENTANYL CITRATE 50 UG/ML
25-50 INJECTION, SOLUTION INTRAMUSCULAR; INTRAVENOUS
Status: DISCONTINUED | OUTPATIENT
Start: 2019-02-25 | End: 2019-02-25 | Stop reason: HOSPADM

## 2019-02-25 RX ORDER — IBUPROFEN 600 MG/1
600 TABLET, FILM COATED ORAL EVERY 6 HOURS PRN
Status: DISCONTINUED | OUTPATIENT
Start: 2019-02-25 | End: 2019-02-26 | Stop reason: HOSPADM

## 2019-02-25 RX ORDER — CEFAZOLIN SODIUM 1 G/3ML
1 INJECTION, POWDER, FOR SOLUTION INTRAMUSCULAR; INTRAVENOUS SEE ADMIN INSTRUCTIONS
Status: DISCONTINUED | OUTPATIENT
Start: 2019-02-25 | End: 2019-02-25 | Stop reason: HOSPADM

## 2019-02-25 RX ORDER — DIPHENHYDRAMINE HCL 25 MG
25 CAPSULE ORAL EVERY 4 HOURS PRN
Status: DISCONTINUED | OUTPATIENT
Start: 2019-02-25 | End: 2019-02-26 | Stop reason: HOSPADM

## 2019-02-25 RX ORDER — SODIUM CHLORIDE, SODIUM LACTATE, POTASSIUM CHLORIDE, CALCIUM CHLORIDE 600; 310; 30; 20 MG/100ML; MG/100ML; MG/100ML; MG/100ML
INJECTION, SOLUTION INTRAVENOUS CONTINUOUS
Status: DISCONTINUED | OUTPATIENT
Start: 2019-02-25 | End: 2019-02-25 | Stop reason: HOSPADM

## 2019-02-25 RX ORDER — OXYCODONE HYDROCHLORIDE 5 MG/1
5-10 TABLET ORAL
Status: DISCONTINUED | OUTPATIENT
Start: 2019-02-25 | End: 2019-02-26

## 2019-02-25 RX ORDER — HYDROMORPHONE HYDROCHLORIDE 1 MG/ML
INJECTION, SOLUTION INTRAMUSCULAR; INTRAVENOUS; SUBCUTANEOUS
Status: COMPLETED
Start: 2019-02-25 | End: 2019-02-25

## 2019-02-25 RX ORDER — ALBUTEROL SULFATE 90 UG/1
2 AEROSOL, METERED RESPIRATORY (INHALATION) EVERY 6 HOURS PRN
Status: DISCONTINUED | OUTPATIENT
Start: 2019-02-25 | End: 2019-02-26 | Stop reason: HOSPADM

## 2019-02-25 RX ORDER — ONDANSETRON 4 MG/1
4 TABLET, ORALLY DISINTEGRATING ORAL EVERY 6 HOURS PRN
Status: DISCONTINUED | OUTPATIENT
Start: 2019-02-25 | End: 2019-02-26 | Stop reason: HOSPADM

## 2019-02-25 RX ORDER — CALCIUM CARBONATE 500 MG/1
1000 TABLET, CHEWABLE ORAL 4 TIMES DAILY PRN
Status: DISCONTINUED | OUTPATIENT
Start: 2019-02-25 | End: 2019-02-26 | Stop reason: HOSPADM

## 2019-02-25 RX ORDER — DIPHENHYDRAMINE HYDROCHLORIDE 50 MG/ML
12.5 INJECTION INTRAMUSCULAR; INTRAVENOUS EVERY 6 HOURS PRN
Status: DISCONTINUED | OUTPATIENT
Start: 2019-02-25 | End: 2019-02-25

## 2019-02-25 RX ORDER — LIDOCAINE 40 MG/G
CREAM TOPICAL
Status: DISCONTINUED | OUTPATIENT
Start: 2019-02-25 | End: 2019-02-25 | Stop reason: HOSPADM

## 2019-02-25 RX ORDER — ZOLPIDEM TARTRATE 5 MG/1
10 TABLET ORAL
Status: DISCONTINUED | OUTPATIENT
Start: 2019-02-25 | End: 2019-02-25

## 2019-02-25 RX ORDER — LORAZEPAM 1 MG/1
1 TABLET ORAL 2 TIMES DAILY PRN
Status: DISCONTINUED | OUTPATIENT
Start: 2019-02-25 | End: 2019-02-26 | Stop reason: HOSPADM

## 2019-02-25 RX ORDER — MEPERIDINE HYDROCHLORIDE 50 MG/ML
12.5 INJECTION INTRAMUSCULAR; INTRAVENOUS; SUBCUTANEOUS
Status: DISCONTINUED | OUTPATIENT
Start: 2019-02-25 | End: 2019-02-25

## 2019-02-25 RX ORDER — ONDANSETRON 2 MG/ML
INJECTION INTRAMUSCULAR; INTRAVENOUS PRN
Status: DISCONTINUED | OUTPATIENT
Start: 2019-02-25 | End: 2019-02-25

## 2019-02-25 RX ORDER — LIDOCAINE 40 MG/G
CREAM TOPICAL
Status: DISCONTINUED | OUTPATIENT
Start: 2019-02-25 | End: 2019-02-26 | Stop reason: HOSPADM

## 2019-02-25 RX ORDER — LIDOCAINE HYDROCHLORIDE 20 MG/ML
INJECTION, SOLUTION INFILTRATION; PERINEURAL PRN
Status: DISCONTINUED | OUTPATIENT
Start: 2019-02-25 | End: 2019-02-25

## 2019-02-25 RX ORDER — ACETAMINOPHEN 325 MG/1
650 TABLET ORAL EVERY 4 HOURS PRN
Qty: 100 TABLET | Refills: 0 | Status: SHIPPED | OUTPATIENT
Start: 2019-02-25 | End: 2019-03-27

## 2019-02-25 RX ORDER — HYDROMORPHONE HCL/0.9% NACL/PF 0.2MG/0.2
0.2 SYRINGE (ML) INTRAVENOUS
Status: DISCONTINUED | OUTPATIENT
Start: 2019-02-25 | End: 2019-02-25

## 2019-02-25 RX ORDER — HYDROMORPHONE HYDROCHLORIDE 1 MG/ML
.3-.5 INJECTION, SOLUTION INTRAMUSCULAR; INTRAVENOUS; SUBCUTANEOUS EVERY 10 MIN PRN
Status: DISCONTINUED | OUTPATIENT
Start: 2019-02-25 | End: 2019-02-25

## 2019-02-25 RX ORDER — ACETAMINOPHEN 325 MG/1
650 TABLET ORAL EVERY 6 HOURS PRN
Status: DISCONTINUED | OUTPATIENT
Start: 2019-02-25 | End: 2019-02-26 | Stop reason: HOSPADM

## 2019-02-25 RX ORDER — HYDROMORPHONE HYDROCHLORIDE 1 MG/ML
.3-.5 INJECTION, SOLUTION INTRAMUSCULAR; INTRAVENOUS; SUBCUTANEOUS
Status: DISCONTINUED | OUTPATIENT
Start: 2019-02-25 | End: 2019-02-26

## 2019-02-25 RX ORDER — DEXAMETHASONE SODIUM PHOSPHATE 4 MG/ML
INJECTION, SOLUTION INTRA-ARTICULAR; INTRALESIONAL; INTRAMUSCULAR; INTRAVENOUS; SOFT TISSUE PRN
Status: DISCONTINUED | OUTPATIENT
Start: 2019-02-25 | End: 2019-02-25

## 2019-02-25 RX ORDER — ONDANSETRON 2 MG/ML
4 INJECTION INTRAMUSCULAR; INTRAVENOUS EVERY 30 MIN PRN
Status: DISCONTINUED | OUTPATIENT
Start: 2019-02-25 | End: 2019-02-25

## 2019-02-25 RX ORDER — CEFAZOLIN SODIUM 2 G/100ML
2 INJECTION, SOLUTION INTRAVENOUS
Status: COMPLETED | OUTPATIENT
Start: 2019-02-25 | End: 2019-02-25

## 2019-02-25 RX ADMIN — CEFAZOLIN SODIUM 2 G: 2 INJECTION, SOLUTION INTRAVENOUS at 12:15

## 2019-02-25 RX ADMIN — ROCURONIUM BROMIDE 10 MG: 10 INJECTION INTRAVENOUS at 14:31

## 2019-02-25 RX ADMIN — DEXAMETHASONE SODIUM PHOSPHATE 8 MG: 4 INJECTION, SOLUTION INTRA-ARTICULAR; INTRALESIONAL; INTRAMUSCULAR; INTRAVENOUS; SOFT TISSUE at 12:15

## 2019-02-25 RX ADMIN — ROCURONIUM BROMIDE 10 MG: 10 INJECTION INTRAVENOUS at 14:24

## 2019-02-25 RX ADMIN — DIPHENHYDRAMINE HYDROCHLORIDE 12.5 MG: 50 INJECTION INTRAMUSCULAR; INTRAVENOUS at 19:25

## 2019-02-25 RX ADMIN — CEFAZOLIN 1 G: 1 INJECTION, POWDER, FOR SOLUTION INTRAMUSCULAR; INTRAVENOUS at 14:15

## 2019-02-25 RX ADMIN — FENTANYL CITRATE 100 MCG: 50 INJECTION, SOLUTION INTRAMUSCULAR; INTRAVENOUS at 12:33

## 2019-02-25 RX ADMIN — SODIUM CHLORIDE, POTASSIUM CHLORIDE, SODIUM LACTATE AND CALCIUM CHLORIDE: 600; 310; 30; 20 INJECTION, SOLUTION INTRAVENOUS at 15:15

## 2019-02-25 RX ADMIN — IBUPROFEN 600 MG: 600 TABLET ORAL at 22:43

## 2019-02-25 RX ADMIN — ONDANSETRON 4 MG: 2 INJECTION INTRAMUSCULAR; INTRAVENOUS at 14:54

## 2019-02-25 RX ADMIN — LIDOCAINE HYDROCHLORIDE 40 MG: 20 INJECTION, SOLUTION INFILTRATION; PERINEURAL at 12:02

## 2019-02-25 RX ADMIN — OXYCODONE HYDROCHLORIDE 5 MG: 5 TABLET ORAL at 22:43

## 2019-02-25 RX ADMIN — ROCURONIUM BROMIDE 10 MG: 10 INJECTION INTRAVENOUS at 13:50

## 2019-02-25 RX ADMIN — Medication 0.5 MG: at 18:57

## 2019-02-25 RX ADMIN — ROCURONIUM BROMIDE 20 MG: 10 INJECTION INTRAVENOUS at 13:12

## 2019-02-25 RX ADMIN — Medication 0.5 MG: at 19:07

## 2019-02-25 RX ADMIN — HYDROMORPHONE HYDROCHLORIDE 0.5 MG: 1 INJECTION, SOLUTION INTRAMUSCULAR; INTRAVENOUS; SUBCUTANEOUS at 14:37

## 2019-02-25 RX ADMIN — SUGAMMADEX 200 MG: 100 INJECTION, SOLUTION INTRAVENOUS at 15:08

## 2019-02-25 RX ADMIN — Medication 0.5 MG: at 20:15

## 2019-02-25 RX ADMIN — MIDAZOLAM HYDROCHLORIDE 1 MG: 2 INJECTION, SOLUTION INTRAMUSCULAR; INTRAVENOUS at 10:53

## 2019-02-25 RX ADMIN — PHENYLEPHRINE HYDROCHLORIDE 100 MCG: 10 INJECTION, SOLUTION INTRAMUSCULAR; INTRAVENOUS; SUBCUTANEOUS at 12:36

## 2019-02-25 RX ADMIN — FENTANYL CITRATE 50 MCG: 50 INJECTION, SOLUTION INTRAMUSCULAR; INTRAVENOUS at 12:01

## 2019-02-25 RX ADMIN — HYDROMORPHONE HYDROCHLORIDE 0.5 MG: 1 INJECTION, SOLUTION INTRAMUSCULAR; INTRAVENOUS; SUBCUTANEOUS at 13:56

## 2019-02-25 RX ADMIN — Medication 0.5 MG: at 15:49

## 2019-02-25 RX ADMIN — OXYCODONE HYDROCHLORIDE 5 MG: 5 TABLET ORAL at 21:05

## 2019-02-25 RX ADMIN — SODIUM CHLORIDE, POTASSIUM CHLORIDE, SODIUM LACTATE AND CALCIUM CHLORIDE: 600; 310; 30; 20 INJECTION, SOLUTION INTRAVENOUS at 11:22

## 2019-02-25 RX ADMIN — BUPIVACAINE HYDROCHLORIDE 20 ML: 2.5 INJECTION, SOLUTION EPIDURAL; INFILTRATION; INTRACAUDAL; PERINEURAL at 10:54

## 2019-02-25 RX ADMIN — MIDAZOLAM HYDROCHLORIDE 2 MG: 2 INJECTION, SOLUTION INTRAMUSCULAR; INTRAVENOUS at 15:47

## 2019-02-25 RX ADMIN — MIDAZOLAM 2 MG: 1 INJECTION INTRAMUSCULAR; INTRAVENOUS at 11:58

## 2019-02-25 RX ADMIN — FENTANYL CITRATE 50 MCG: 50 INJECTION, SOLUTION INTRAMUSCULAR; INTRAVENOUS at 15:45

## 2019-02-25 RX ADMIN — ROCURONIUM BROMIDE 20 MG: 10 INJECTION INTRAVENOUS at 12:45

## 2019-02-25 RX ADMIN — PHENYLEPHRINE HYDROCHLORIDE 200 MCG: 10 INJECTION, SOLUTION INTRAMUSCULAR; INTRAVENOUS; SUBCUTANEOUS at 12:06

## 2019-02-25 RX ADMIN — BUPIVACAINE 20 ML: 13.3 INJECTION, SUSPENSION, LIPOSOMAL INFILTRATION at 10:54

## 2019-02-25 RX ADMIN — FENTANYL CITRATE 50 MCG: 50 INJECTION, SOLUTION INTRAMUSCULAR; INTRAVENOUS at 11:58

## 2019-02-25 RX ADMIN — PROPOFOL 140 MG: 10 INJECTION, EMULSION INTRAVENOUS at 12:02

## 2019-02-25 RX ADMIN — ROCURONIUM BROMIDE 50 MG: 10 INJECTION INTRAVENOUS at 12:03

## 2019-02-25 RX ADMIN — ACETAMINOPHEN 650 MG: 325 TABLET, FILM COATED ORAL at 20:33

## 2019-02-25 RX ADMIN — INDIGO CARMINE 3 ML: 8 INJECTION, SOLUTION INTRAMUSCULAR; INTRAVENOUS at 12:43

## 2019-02-25 RX ADMIN — FENTANYL CITRATE 50 MCG: 50 INJECTION, SOLUTION INTRAMUSCULAR; INTRAVENOUS at 10:53

## 2019-02-25 ASSESSMENT — PAIN DESCRIPTION - DESCRIPTORS
DESCRIPTORS: SHARP
DESCRIPTORS: ACHING;SHARP;SORE

## 2019-02-25 ASSESSMENT — MIFFLIN-ST. JEOR: SCORE: 1406.38

## 2019-02-25 NOTE — BRIEF OP NOTE
[facility], [place]    Brief Operative Note    Pre-operative diagnosis: Malignant Neoplasm Of cervix  Post-operative diagnosis same  Procedure: Procedure(s):  DaVinci Assisted Radical Hysterectomy, Sentinal Lymph Node Biopsies  DaVinci Assisted Bilateral Salpingectomy  Surgeon: Surgeon(s) and Role:     * Alexandra Michael MD - Primary     * Lizabeth Garcia MD - Resident - Assisting     * Felicia Herndon MD - Fellow - Assisting  Anesthesia: Combined General with Block   Estimated blood loss: Less than 50 ml  Drains: rodriguez  Specimens:   ID Type Source Tests Collected by Time Destination   A : Left sentinel lymph node Tissue Lymph Node, Left Pelvic SURGICAL PATHOLOGY EXAM Alexandra Michael MD 2/25/2019 12:53 PM    B : Right Pelvic Sentinal Node Tissue Lymph Node, Right Pelvic SURGICAL PATHOLOGY EXAM Alexandra Michael MD 2/25/2019  1:08 PM    C : Uterus, cervix, bilateral fallopian tubes, upper vagina Tissue Uterus, Cervix and Bilateral Fallopian Tubes SURGICAL PATHOLOGY EXAM Alexandra Michael MD 2/25/2019  2:38 PM      Findings:  Cervix with possible tumor vs granulation tissue from previous LEEP.  mobile uterus. No injury on entry. Normal  Appearing tubes and ovaries bilaterally. See op report for more details.   Complications:none  Implants: None.    Lizabeth Garcia MD   PGY-3 Ob/Gyn

## 2019-02-25 NOTE — ANESTHESIA POSTPROCEDURE EVALUATION
Anesthesia POST Procedure Evaluation    Patient: Delicia Zhou   MRN:     [id number] Gender:   female   Age:    38 year old :      1980        Preoperative Diagnosis: Malignant Neoplasm Of Exocervix   Procedure(s):  DaVinci Assisted Radical Hysterectomy, Sentinal Lymph Node Biopsies  DaVinci Assisted Bilateral Salpingectomy   Postop Comments: No value filed.       Anesthesia Type:  General    Reportable Event: NO     PAIN: Uncomplicated   Sign Out status: Comfortable, Well controlled pain     PONV: No PONV   Sign Out status:  No Nausea or Vomiting     Neuro/Psych: Uneventful perioperative course   Sign Out Status: Preoperative baseline; Age appropriate mentation     Airway/Resp.: Uneventful perioperative course   Sign Out Status: Non labored breathing, age appropriate RR; Resp. Status within EXPECTED Parameters     CV: Uneventful perioperative course   Sign Out status: Appropriate BP and perfusion indices; Appropriate HR/Rhythm     Disposition:   Sign Out in:  PACU  Disposition:  Phase II; Home  Recovery Course: Uneventful  Follow-Up: Not required           Last Anesthesia Record Vitals:  CRNA VITALS  2019 1453 - 2019 1553      2019             Pulse:  105    SpO2:  100 %    Resp Rate (observed):  1  (Abnormal)           Last PACU/Preop Vitals:  Vitals:    19 1645 19 1700 19 1715   BP: 106/61 101/61 107/65   Pulse:  75    Resp: 10 12 10   Temp:   36.8  C (98.3  F)   SpO2: 97% 96% 95%         Electronically Signed By: Nereida Youngblood MD, 2019, 5:32 PM

## 2019-02-25 NOTE — OR NURSING
Dr. Youngblood called to sign out, states she will complete sign out and patient is okay to go to the floor.

## 2019-02-25 NOTE — ANESTHESIA CARE TRANSFER NOTE
Patient: Delicia Zhou    Procedure(s):  DaVinci Assisted Radical Hysterectomy, Sentinal Lymph Node Biopsies  DaVinci Assisted Bilateral Salpingectomy    Diagnosis: Malignant Neoplasm Of Exocervix  Diagnosis Additional Information: No value filed.    Anesthesia Type:   No value filed.     Note:  Airway :Face Mask  Patient transferred to:PACU  Handoff Report: Identifed the Patient, Identified the Reponsible Provider, Reviewed the pertinent medical history, Discussed the surgical course, Reviewed Intra-OP anesthesia mangement and issues during anesthesia, Set expectations for post-procedure period and Allowed opportunity for questions and acknowledgement of understanding      Vitals: (Last set prior to Anesthesia Care Transfer)    CRNA VITALS  2/25/2019 1453 - 2/25/2019 1531      2/25/2019             Pulse:  105    SpO2:  100 %    Resp Rate (observed):  1  (Abnormal)                 Electronically Signed By: DAQUAN Gibson CRNA  February 25, 2019  3:31 PM

## 2019-02-25 NOTE — DISCHARGE SUMMARY
Gynecologic Oncology Discharge Summary    Delicia Zhou  [id number]    Admit Date: 2/25/2019  Discharge Date: 2/26/19  Admitting Provider: Dr Michael  Discharge Provider: Dr Salazar    Admission Dx:   - At least stage IBI adenocarcinoma of the cervix, final path pending  - Hx Depression  - Hx Anxiety  - Hx ADD    Discharge Dx:  - At least stage IBI adenocarcinoma of the cervix, final path pending  - Hx Depression  - Hx Anxiety  - Hx ADD    Patient Active Problem List   Diagnosis     Intermittent asthma     CARDIOVASCULAR SCREENING; LDL GOAL LESS THAN 160     Anxiety     Body dysmorphic disorder     Controlled substance agreement signed     Elevated blood pressure reading without diagnosis of hypertension     Attention deficit disorder (ADD) without hyperactivity     Mild recurrent major depression (H)     Controlled substance agreement broken     Status post hysterectomy     Procedures:   Robotic-assisted radical hysterectomy, bilateral sentinel lymph node mapping and dissection.    Prior to Admission Medications:  Medications Prior to Admission   Medication Sig Dispense Refill Last Dose     albuterol (PROAIR HFA/PROVENTIL HFA/VENTOLIN HFA) 108 (90 BASE) MCG/ACT Inhaler Inhale 2 puffs into the lungs every 6 hours as needed for shortness of breath / dyspnea 1 Inhaler 6 2/24/2019 at 1200     amphetamine-dextroamphetamine (ADDERALL) 30 MG per tablet Take 1 tablet (30 mg) by mouth daily 30 tablet 0 2/23/2019     LORazepam (ATIVAN) 1 MG tablet TK 1 T PO BID PRA  0 2/23/2019     [DISCONTINUED] ibuprofen (ADVIL/MOTRIN) 600 MG tablet Take 1 tablet (600 mg) by mouth every 6 hours as needed for other (mild and/or inflammatory pain) 30 tablet 0 Taking     Discharge Medications:     Review of your medicines      START taking      Dose / Directions   acetaminophen 325 MG tablet  Commonly known as:  TYLENOL  Used for:  Status post radical vaginal hysterectomy      Dose:  650 mg  Take 2 tablets (650 mg) by mouth every 4 hours as  needed for other (mild pain)  Quantity:  100 tablet  Refills:  0     nitroFURantoin macrocrystal-monohydrate 100 MG capsule  Commonly known as:  MACROBID  Used for:  Status post radical vaginal hysterectomy      Dose:  100 mg  Take 1 capsule (100 mg) by mouth At Bedtime  Quantity:  4 capsule  Refills:  0     oxyCODONE 5 MG tablet  Commonly known as:  ROXICODONE  Used for:  Status post radical vaginal hysterectomy      Dose:  5-10 mg  Take 1-2 tablets (5-10 mg) by mouth every 6 hours as needed for pain (Moderate to Severe)  Quantity:  15 tablet  Refills:  0        CONTINUE these medicines which may have CHANGED, or have new prescriptions. If we are uncertain of the size of tablets/capsules you have at home, strength may be listed as something that might have changed.      Dose / Directions   ibuprofen 600 MG tablet  Commonly known as:  ADVIL/MOTRIN  This may have changed:  reasons to take this  Used for:  Status post radical vaginal hysterectomy      Dose:  600 mg  Take 1 tablet (600 mg) by mouth every 6 hours as needed for pain (mild)  Quantity:  30 tablet  Refills:  0     zolpidem 10 MG tablet  Commonly known as:  AMBIEN  This may have changed:      how much to take    how to take this    when to take this    reasons to take this      Dose:  5 mg  Take 0.5 tablets (5 mg) by mouth nightly as needed for sleep (HOLD WHILE TAKING NARCOTICS)  Refills:  0        CONTINUE these medicines which have NOT CHANGED      Dose / Directions   albuterol 108 (90 Base) MCG/ACT inhaler  Commonly known as:  PROAIR HFA/PROVENTIL HFA/VENTOLIN HFA  Used for:  Intermittent asthma, uncomplicated      Dose:  2 puff  Inhale 2 puffs into the lungs every 6 hours as needed for shortness of breath / dyspnea  Quantity:  1 Inhaler  Refills:  6     amphetamine-dextroamphetamine 30 MG tablet  Commonly known as:  ADDERALL  Used for:  Attention deficit disorder (ADD) without hyperactivity      Dose:  30 mg  Take 1 tablet (30 mg) by mouth  daily  Quantity:  30 tablet  Refills:  0     LORazepam 1 MG tablet  Commonly known as:  ATIVAN      TK 1 T PO BID PRA  Refills:  0           Where to get your medicines      These medications were sent to [facility] - [place], [place] - [address]  [address] [address]    Phone:  [phone]     acetaminophen 325 MG tablet    ibuprofen 600 MG tablet    nitroFURantoin macrocrystal-monohydrate 100 MG capsule     Some of these will need a paper prescription and others can be bought over the counter. Ask your nurse if you have questions.    Bring a paper prescription for each of these medications    oxyCODONE 5 MG tablet       Consultations:  None    Brief History of Illness:  Long standing history of abnormal pap smears. Had a BTL 2018 but did not have further follow-up.  11-12/2018: heavy bleeding, got a pap smear and colposcopy (results not available)  1/22/19: CKC with 1.3 x 0.8cm of invasive adenocarcinoma of the cervix with associated AIS. No LVSI. Tumor extends to deep margin  1/28/19: PET with hypermetabolic cervix. No evidence of mets. Indeterminant lung nodule.     Hospital Course:  Dz:   - Preoperative diagnosis was at least stage 1B1 adenocarcinoma of the cervix.  Final pathology is pending at the time of discharge.  She will follow-up postoperatively for a care plan.  FEN:   - She was maintained on IVF until POD#0, when her diet was slowly advanced.  By discharge, she was tolerating a regular diet without nausea and vomiting and able to maintain her hydration without IVF supplementation.  Pain:   - Her pain was initially controlled on a dilaudid PCA.  Once tolerating PO pain meds, she was transitioned to a PO pain regimen.  Her pain was well controlled on this and she was discharged home with these medications.  CV:   - She has no history of CV issues.  Her vital signs were stable while in house and she had no acute CV issues.  PULM:   - She has a history of  intermittent asthma, continued on home albuterol PRN.  She was initially given O2 supplementation in to maintain her O2 sats in the immediate postop period and was transitioned off of this without difficulty.  By discharge, her O2 sats were greater than 90% on RA.  She was encouraged to use her bedside IS while in house.  She had no acute pulmonary issues while in house.  HEME:   - Her preoperative Hgb was 14.1 and EBL during surgery was 50 ml. A post-operative hemoglobin was not checked. She had no other acute heme issues while in house.  GI:   - She was made NPO prior to the procedure.  On POD#0, her diet was advanced slowly as tolerated.  At the time of discharge, she was tolerating a regular diet without nausea and vomiting.  She will be discharged with a bowel regimen to prevent constipation in the postoperative period.  She had no acute GI issues while in house.  :    -  A rodrigeuz catheter was placed at the time of the surgery.  The rodriguez catheter was removed on POD#1 and she underwent an active TOV.  She was unable to void spontaneously prior to discharge and rodriguez catheter was re-placed. She received rodriguez catheter patient education prior to discharge.  She had no other acute  issues while in house.  ID:   - The patient was AF during her hospitalization.  She received standard preoperative antibiotics without incident.    ENDO:   - No acute issues  PSYCH/NEURO:   - Hx of ADD, depression, and anxiety. Hx of violation of pain contract. Continued on home adderall. She received one dose of ativan. Home Ambien held while inpatient but continued at time of discharge.   PPX:    - She was given SCDs, IS, PPI and lovenox during her hospital course.  She tolerated these prophylactic interventions without incident.  They were discontinued at the time of her discharge.    Discharge Instructions and Follow up:  Ms. Delicia Zhou was discharged from the hospital with follow up with Gynecology Oncology  Clinic.    Discharge Diet: Regular  Discharge Activity: Activity as tolerated  Discharge Follow up:   3/1/19 Trial of Void  3/5/19 Dr Michael    Discharge Disposition:  Discharged to home    Discharge Staff: Dr Marie Yun MD  OB/GYN PGY-1  02/26/19 2:21 PM    Nasrin Salazar MD    Department of Ob/Gyn and Women's Health  Division of Gynecologic Oncology  [facility]  [phone]    I saw and evaluated the patient with the resident.  I edited and reviewed the above note.

## 2019-02-25 NOTE — ANESTHESIA PREPROCEDURE EVALUATION
Anesthesia Pre-Procedure Evaluation    Patient: Delicia Zhou   MRN:     [id number] Gender:   female   Age:    38 year old :      1980        Preoperative Diagnosis: Malignant Neoplasm Of Exocervix   Procedure(s):  DaVinci Assisted Radical Hysterectomy, Sentinal Lymph Node Biopsies  DaVinci Assisted Bilateral Salpingectomy     Past Medical History:   Diagnosis Date     Anxiety 2012    2x week- 3x week     Attention deficit disorder (ADD) without hyperactivity 2017     Controlled substance agreement broken 3/13/2018     Intermittent asthma     seasonal + using albuterol      Mild recurrent major depression (H) 2018      Past Surgical History:   Procedure Laterality Date     CONIZATION N/A 2019    Procedure: COLD KNIFE CONE BIOPSY;  Surgeon: Herb Mistry MD;  Location: [facility] OR     GYN SURGERY      TUBAL LIGATION     HC ENLARGE BREAST WITH IMPLANT  10/2005    Dr. Helton          Anesthesia Evaluation     . Pt has had prior anesthetic. Type: General (LMA; uneventful)           ROS/MED HX    ENT/Pulmonary:     (+)Intermittent asthma Last exacerbation: no recent exacerbation; last albuterol yesterday; ? related to cold weather,Treatment: Inhaler prn,  , . .    Neurologic:  - neg neurologic ROS     Cardiovascular:  - neg cardiovascular ROS       METS/Exercise Tolerance: Comment: Was doing vigorous one hour workouts at gym until January; stopped due to cone/D&C >4 METS   Hematologic:  - neg hematologic  ROS       Musculoskeletal:  - neg musculoskeletal ROS       GI/Hepatic:  - neg GI/hepatic ROS       Renal/Genitourinary:  - ROS Renal section negative       Endo:  - neg endo ROS       Psychiatric:     (+) psychiatric history depression and other (comment) (ADHD)      Infectious Disease:  - neg infectious disease ROS       Malignancy:   (+) Malignancy History of Other  Other CA Cervical Cancer for TAHBSO/LND Active status post         Other: Comment: HCG Neg   - neg other ROS  "                    PHYSICAL EXAM:   Mental Status/Neuro: A/A/O   Airway: Facies: Feasible  Mallampati: I  Mouth/Opening: Full  TM distance: > 6 cm  Neck ROM: Full   Respiratory: Auscultation: CTAB     Resp. Rate: Normal     Resp. Effort: Other    (Breath sounds clear with forced expiration)   CV: Rhythm: Regular  Rate: Age appropriate  Heart: Normal Sounds   Comments:      Dental: Normal                  Lab Results   Component Value Date    HGB 14.1 01/22/2019    HCG Negative 02/25/2019       Preop Vitals  BP Readings from Last 3 Encounters:   02/25/19 117/76   02/19/19 138/83   02/15/19 136/84    Pulse Readings from Last 3 Encounters:   02/25/19 100   02/19/19 110   02/15/19 107      Resp Readings from Last 3 Encounters:   02/25/19 16   02/15/19 16   01/22/19 16    SpO2 Readings from Last 3 Encounters:   02/25/19 100%   02/19/19 100%   02/15/19 100%      Temp Readings from Last 1 Encounters:   02/25/19 36.8  C (98.2  F) (Oral)    Ht Readings from Last 1 Encounters:   02/25/19 1.753 m (5' 9\")      Wt Readings from Last 1 Encounters:   02/25/19 66.2 kg (145 lb 15.1 oz)    Estimated body mass index is 21.55 kg/m  as calculated from the following:    Height as of this encounter: 1.753 m (5' 9\").    Weight as of this encounter: 66.2 kg (145 lb 15.1 oz).     LDA:  Peripheral IV Anterior;Left Hand (Active)   Site Assessment WDL 2/25/2019 10:31 AM   Line Status Saline locked 2/25/2019 10:31 AM   Phlebitis Scale 1-->erythema at access site with or without pain 2/25/2019 10:31 AM   Infiltration Scale 0 2/25/2019 10:31 AM   Number of days:             Assessment:   ASA SCORE: 2    NPO Status: > 6 hours since completed [employer]   Documentation: H&P complete; Preop Testing complete; Consents complete   Proceeding: Proceed without further delay  Tobacco Use:  NO Active use of Tobacco/UNKNOWN Tobacco use status     Plan:   Anes. Type:  General   Pre-Induction: Midazolam IV   Induction:  IV (Standard)   Airway: Oral ETT "   Access/Monitoring: PIV; 2nd PIV   Maintenance: Balanced   Emergence: Procedure Site   Logistics: Same Day Surgery     Postop Pain/Sedation Strategy:  Standard-Options: Opioids PRN     PONV Management:  Adult Risk Factors: Female, Non-Smoker, Postop Opioids  Prevention: Ondansetron; Dexamethasone     CONSENT: Direct conversation   Plan and risks discussed with: Patient   Blood Products: Consented (ALL Blood Products)       Comments for Plan/Consent:  Patient seen and examined by me and available records reviewed. Anesthetic options and risks discussed with patient who agrees to proceed.  Questions answered.    Aubrie Archuleta MD  2/25/2019  10:59 AM                           Aubrie Archuleta MD

## 2019-02-25 NOTE — PROGRESS NOTES
"Gynecologic Oncology Postoperative Check Note  2/25/2019    S: Patient just arrived to floor from PACU. Crying and screaming in pain, hyperventilating. Was able to transfer on her own from transfer cart to the bed, but then became significantly distressed. Unable to be re-focused or talked down. Patient was given 0.5mg of dilaudid then transferred into a private room due to the level of disturbance she caused to her roommate.     After moving rooms and receiving dilaudid, was able to calm down. Reports pain in her abdomen that worsened after she transferred beds. Denies cp, sob, n/v. Pain is slightly better since she got the dilaudid. Asked if she thinks anxiety is playing a role in her pain and she thinks it is. Will take her PTA PO Ativan.     O:  Vitals:    02/25/19 0935 02/25/19 1051   BP: 117/76 122/66   Pulse: 100 80   Resp: 16 10   Temp: 98.2  F (36.8  C)    TempSrc: Oral    SpO2: 100% 100%   Weight: 66.2 kg (145 lb 15.1 oz)    Height: 1.753 m (5' 9\")    O2 2L NC    Gen: Hysterical, breathing loudly and quickly and crying   Cardio: RRR  Resp: CTAB  Abdomen: soft, tender to palpation, nondistended, no rebound tenderness, mild guarding   Incision: lsc incisions covered in clean bandages   Extremities: soft, nontender, SCDs in place     A: 38 year old POD#0 s/p davinci assisted radical hysterectomy with bilateral salpingectomy and sentinal lymph node biopsies.    Dz: At least stage 1B1 adenocarcinoma of the cervix. PET with hypermetabolic cervix. No evidence of mets. Indeterminant lung nodule.   FEN: LR at 100 ml/hour. Regular diet.   Pain: Acetaminophen, ibuprofen, oxycodone and dilaudid PRN  Heme: Hgb 14.1>EBL 50  CV: NI  Pulm: Hx asthma - PTA albuterol prn. Supplemental O2 PRN. Encourage IS. Indeterminate lung nodule: Will plan a followup CT in 3-6 months.  GI: PRN antiemetics and laxatives.   : Plan for active voiding trial in am. If she fails would plan a voiding trial Friday after surgery and plan for " macrobid ppx if catheter remains in.   ID: Afebrile. Patient received preop antibiotics.   Endo: NI  Psych/Neuro: Hx of ADD, depression and anxiety. Also hx of controlled substance agreement violation. PTA ativan ordered. PTA Ambien held.   PPX: SCDs   Dispo: Possibly home tomorrow if meeting post op goals. Follow up with Dr Michael in clinic 3/5/19.      Patient was able to calm down after receiving dilaudid and transferring to private room. Discussed reassuring exam and post-operative expectation. Will keep rodriguez in until POD#1. Patient will take PTA Ativan to help with anxiety.     Fabiana Andrea MD  OB/Gyn PGY-2  2/25/2019

## 2019-02-25 NOTE — ANESTHESIA PROCEDURE NOTES
Peripheral Nerve Block Procedure Note    Staff:     Anesthesiologist:  Boo Mas MD    Resident/CRNA:  Peng Mabry MD    Block performed by resident/CRNA in the presence of a teaching physician    Location: Pre-op  Procedure Start/Stop TImes:      2/25/2019 10:53 AM     2/25/2019 10:58 AM    patient identified, IV checked, site marked, risks and benefits discussed, informed consent, monitors and equipment checked, pre-op evaluation, at physician/surgeon's request and post-op pain management      Correct Patient: Yes      Correct Position: Yes      Correct Site: Yes      Correct Procedure: Yes      Correct Laterality:  Yes    Site Marked:  Yes  Procedure details:     Procedure:  TAP    ASA:  2    Laterality:  Bilateral    Position:  Supine    Sterile Prep: chloraprep, mask and sterile gloves      Needle:  Short bevel    Needle gauge:  21    Needle length (mm):  110    Ultrasound: Yes      Ultrasound used to identify targeted nerve, plexus, or vascular structure and placed a needle adjacent to it      Permanent Image entered into patiient's record      Abnormal pain on injection: No      Blood Aspirated: No      Paresthesias:  No    Bleeding at site: No      Bolus via:  Needle    Infusion Method:  Single Shot    Complications:  None  Assessment/Narrative:     Injection made incrementally with aspirations every (mL):  5

## 2019-02-26 VITALS
DIASTOLIC BLOOD PRESSURE: 61 MMHG | WEIGHT: 147.4 LBS | HEART RATE: 64 BPM | BODY MASS INDEX: 21.83 KG/M2 | SYSTOLIC BLOOD PRESSURE: 101 MMHG | TEMPERATURE: 98.8 F | RESPIRATION RATE: 18 BRPM | HEIGHT: 69 IN | OXYGEN SATURATION: 100 %

## 2019-02-26 PROCEDURE — 25800030 ZZH RX IP 258 OP 636: Performed by: STUDENT IN AN ORGANIZED HEALTH CARE EDUCATION/TRAINING PROGRAM

## 2019-02-26 PROCEDURE — 25000132 ZZH RX MED GY IP 250 OP 250 PS 637: Performed by: STUDENT IN AN ORGANIZED HEALTH CARE EDUCATION/TRAINING PROGRAM

## 2019-02-26 PROCEDURE — 25000132 ZZH RX MED GY IP 250 OP 250 PS 637: Performed by: NURSE PRACTITIONER

## 2019-02-26 PROCEDURE — 25000128 H RX IP 250 OP 636: Performed by: STUDENT IN AN ORGANIZED HEALTH CARE EDUCATION/TRAINING PROGRAM

## 2019-02-26 PROCEDURE — 25000125 ZZHC RX 250

## 2019-02-26 PROCEDURE — 99024 POSTOP FOLLOW-UP VISIT: CPT | Performed by: OBSTETRICS & GYNECOLOGY

## 2019-02-26 RX ORDER — SODIUM CHLORIDE, SODIUM LACTATE, POTASSIUM CHLORIDE, CALCIUM CHLORIDE 600; 310; 30; 20 MG/100ML; MG/100ML; MG/100ML; MG/100ML
INJECTION, SOLUTION INTRAVENOUS CONTINUOUS
Status: DISCONTINUED | OUTPATIENT
Start: 2019-02-26 | End: 2019-02-26 | Stop reason: HOSPADM

## 2019-02-26 RX ORDER — ZOLPIDEM TARTRATE 10 MG/1
5 TABLET ORAL
Refills: 0 | COMMUNITY
Start: 2019-02-26 | End: 2019-03-05

## 2019-02-26 RX ORDER — NITROFURANTOIN 25; 75 MG/1; MG/1
100 CAPSULE ORAL AT BEDTIME
Qty: 4 CAPSULE | Refills: 0 | Status: SHIPPED | OUTPATIENT
Start: 2019-02-26

## 2019-02-26 RX ORDER — MAGNESIUM HYDROXIDE 1200 MG/15ML
LIQUID ORAL
Status: COMPLETED
Start: 2019-02-26 | End: 2019-02-26

## 2019-02-26 RX ORDER — OXYCODONE HYDROCHLORIDE 5 MG/1
5-10 TABLET ORAL EVERY 6 HOURS PRN
Qty: 15 TABLET | Refills: 0 | Status: SHIPPED | OUTPATIENT
Start: 2019-02-26 | End: 2019-03-01

## 2019-02-26 RX ORDER — OXYCODONE HYDROCHLORIDE 5 MG/1
5-10 TABLET ORAL EVERY 6 HOURS PRN
Status: DISCONTINUED | OUTPATIENT
Start: 2019-02-26 | End: 2019-02-26 | Stop reason: HOSPADM

## 2019-02-26 RX ADMIN — IBUPROFEN 600 MG: 600 TABLET ORAL at 05:08

## 2019-02-26 RX ADMIN — OXYCODONE HYDROCHLORIDE 10 MG: 5 TABLET ORAL at 00:13

## 2019-02-26 RX ADMIN — OXYCODONE HYDROCHLORIDE 5 MG: 5 TABLET ORAL at 06:46

## 2019-02-26 RX ADMIN — ACETAMINOPHEN 650 MG: 325 TABLET, FILM COATED ORAL at 03:43

## 2019-02-26 RX ADMIN — OXYCODONE HYDROCHLORIDE 10 MG: 5 TABLET ORAL at 17:00

## 2019-02-26 RX ADMIN — DIPHENHYDRAMINE HYDROCHLORIDE 25 MG: 25 CAPSULE ORAL at 01:57

## 2019-02-26 RX ADMIN — Medication 0.5 MG: at 01:19

## 2019-02-26 RX ADMIN — SODIUM CHLORIDE 500 ML: 900 IRRIGANT IRRIGATION at 12:04

## 2019-02-26 RX ADMIN — LORAZEPAM 1 MG: 1 TABLET ORAL at 08:30

## 2019-02-26 RX ADMIN — OXYCODONE HYDROCHLORIDE 5 MG: 5 TABLET ORAL at 05:07

## 2019-02-26 RX ADMIN — OXYCODONE HYDROCHLORIDE 10 MG: 5 TABLET ORAL at 10:59

## 2019-02-26 RX ADMIN — IBUPROFEN 600 MG: 600 TABLET ORAL at 17:00

## 2019-02-26 RX ADMIN — OXYCODONE HYDROCHLORIDE 5 MG: 5 TABLET ORAL at 03:43

## 2019-02-26 RX ADMIN — SODIUM CHLORIDE, POTASSIUM CHLORIDE, SODIUM LACTATE AND CALCIUM CHLORIDE 1000 ML: 600; 310; 30; 20 INJECTION, SOLUTION INTRAVENOUS at 05:07

## 2019-02-26 RX ADMIN — ACETAMINOPHEN 650 MG: 325 TABLET, FILM COATED ORAL at 09:41

## 2019-02-26 RX ADMIN — SODIUM CHLORIDE, POTASSIUM CHLORIDE, SODIUM LACTATE AND CALCIUM CHLORIDE: 600; 310; 30; 20 INJECTION, SOLUTION INTRAVENOUS at 08:23

## 2019-02-26 RX ADMIN — IBUPROFEN 600 MG: 600 TABLET ORAL at 11:00

## 2019-02-26 RX ADMIN — ACETAMINOPHEN 650 MG: 325 TABLET, FILM COATED ORAL at 16:27

## 2019-02-26 ASSESSMENT — PAIN DESCRIPTION - DESCRIPTORS
DESCRIPTORS: CONSTANT;SORE
DESCRIPTORS: ACHING;SHARP
DESCRIPTORS: ACHING;DISCOMFORT
DESCRIPTORS: CONSTANT;SORE
DESCRIPTORS: ACHING
DESCRIPTORS: SHARP;ACHING

## 2019-02-26 ASSESSMENT — MIFFLIN-ST. JEOR: SCORE: 1412.98

## 2019-02-26 NOTE — OP NOTE
Procedure Date: 02/25/2019      PREOPERATIVE DIAGNOSES:  Stage IB1 adenocarcinoma of the cervix.      POSTOPERATIVE DIAGNOSIS:  Stage IB1 adenocarcinoma of the cervix with final pathology pending.        SURGERY PERFORMED:     1.  Robotic-assisted bilateral sentinel lymph node mapping and dissection.   2.  Robotic-assisted radical hysterectomy and bilateral salpingectomy.      SURGEON:  Alexandra Michael MD      ASSISTANTS:   1.  Lizabeth Garcia MD (OB/GYN resident).   2.  Felicia Herndon MD (OB/GYN Fellow).      ANESTHESIA:  General with preoperative TAP blocks.      ESTIMATED BLOOD LOSS:  5 mL.      COMPLICATIONS:  None.      SPECIMENS REMOVED:     1.  Uterus, cervix, bilateral fallopian tubes.   2.  Bilateral pelvic sentinel lymph nodes.      INDICATIONS:  Ms Zhou is a 38-year-old with recent abnormal Pap smear and cervical biopsy which showed adenocarcinoma of the cervix.  She had a conization, which also showed a 1.7 cm adenocarcinoma with positive margins.  Imaging was negative for any metastatic disease.  She was counseled on risks and benefits of open versus robotic surgery and elected to proceed with robotic surgery.      FINDINGS:  On bimanual exam, she had normal external genitalia.  Her cervix had evidence of a previous conization procedure.  She did have a hyperemic area on the anterior cervix that was concerning for residual disease versus inflammation from recent excision.  On diagnostic laparotomy, she had no obvious metastatic disease.  Her ovaries appeared normal bilaterally.  She had evidence of a previous tubal ligation and her uterus was globally enlarged.      DESCRIPTION OF PROCEDURE:  After informed consent, the patient was brought to the operating room where general anesthesia was administered.  She was prepped and draped in dorsal lithotomy position and a Beltran catheter was placed in her bladder.  A surgical timeout was performed ensuring correct patient and procedure.      A speculum  was placed in her vagina.  The cervix was well visualized and grasped with a single-tooth tenaculum.  Four mL of ICG green dye was placed in the superficial and deep portions of the cervix at 3 and 9 o'clock.  The cervix was then gently dilated to facilitate placement of a large VCare device which was not sutured in.      Attention was turned to the abdomen.  Penetrating towel clamps were placed on either side of her umbilicus.  The Veress needle was placed.  Opening pressure was 0.  Pneumoperitoneum was established.  A 7 mm incision was made above her umbilicus using a Visiport technique.  The robotic trocar was placed without complication.  Under direct visualization, a right-sided robotic trocar was placed and a right-sided 5 mm accessory port was placed, and 2 left-sided robotic trocars were placed.  The patient was placed in steep Trendelenburg.  The robot was docked, instruments were placed in the patient's pelvis and the abdomen and pelvis were inspected with the above findings noted.      We first started by performing sentinel lymph node dissection.  I opened the left retroperitoneal space, identified the left ureter.  I identified the left external iliac artery and vein.  She had bright ICG staining in 2 lymph nodes, one was along her mid left external iliac artery and one was in the obturator space over the obturator nerve.  With good visualization of surrounding structures, these lymph nodes were removed and placed in an EndoCatch bag and removed at the end of the procedure through the vagina.      In a similar fashion on the right hand side, the right retroperitoneal space was opened through the round ligament.  The right ureter was identified.  The right continuation of the hypogastric artery was identified and retracted medially and the right external iliac artery and vein were identified.  She had a right sentinel lymph node deep in the obturator space along the obturator nerve.  This was gently   from the external iliac vein wall and the obturator nerve and placed in an EndoCatch bag and removed at the end of the procedure through the patient's vagina.      We then performed bilateral salpingectomy by elevating the fallopian tubes and transecting along the mesosalpinx with cautery.  We then used bipolar cautery to coagulate the utero-ovarian vessels bilaterally, thus freeing the ovaries from the uterus.      We then performed the radical portion of the procedure.  Starting on the left hand side, I identified the left ureter and identified the left uterine artery at its origin from the internal iliac at its origin from the hypogastric artery.  The uterine artery and vein were skeletonized and were coagulated and cut.  The vascular complex and the ureter were then  from one another, and the vascular pedicle was elevated and gently dissected up and over the right ureter. Ureterolysis was performed down to the level of the ureteral tunnel.  The posterior peritoneum was also taken down to the level of the ureteral tunnel.  In a similar fashion on the right hand side, the right ureter was identified.  Ureterolysis was performed.  The right uterine artery was identified at its branch from the hypogastric artery.  This was skeletonized and coagulated and cut.  The artery and ureter were further  from one another and the vascular pedicle was elevated and gently dissected up and over the right ureter.      A radical bladder flap dissection was then performed by transecting the anterior peritoneum and dissecting the bladder off the anterior cervix and vagina, carrying down to the level of at least 3 cm below the vaginal cervical junction.      At this point, I turned my attention back to the left hand side.  I further tunneled the ureter and then coagulated the roof of the ureteral tunnel thus unroofing the ureter and continuing to mobilize the ureter laterally.  This was done also on the  right hand side.  This was all done with excellent visualization of the ureter.  I continued to lateralize both ureters and continued my bladder flap dissection as well.      I then sharply anteverted the uterus and performed a posterior dissection.  I transected the posterior peritoneum and gently developed the rectovaginal space.  I dissected the rectum free from the vagina so that I could get at least a 3 cm margin of vagina on the final specimen.  I then performed a posterior colpotomy with the monopolar scissors and then performed an anterior colpotomy with the scissors and then carried this colpotomy around circumferentially using coagulation on any additional pedicles, as well as on the lateral aspect of the parametrium.  The specimen was then removed through the patient's vagina.  The specimen was inspected and adequate 3-4 cm vaginal margins were noted with a large amount of parametrium bilaterally.  The pelvis was copiously irrigated and hemostatic.  The vagina was closed from right to left with a 0 PDS barbed suture and cut flush with the vagina.  The pedicles were reinspected and noted to be hemostatic.  The instruments were removed.The robot was then undocked.    Pneumoperitoneum was evacuated.  The patient was taken out of Trendelenburg.  The ports were removed.  The skin was closed with 4-0 Vicryl in a subcuticular fashion.  The vagina was inspected and hemostatic and the Beltran catheter remained in place for the postoperative period.         COURTNEY WEINSTEIN MD             D: 2019   T: 2019   MT: ADILIA      Name:     MARIA M MIKE   MRN:      [id number]        Account:        [id number]   :      1980           Procedure Date: 2019      Document: [id number]

## 2019-02-26 NOTE — PROVIDER NOTIFICATION
Patient's rodriguez catheter was removed @ 1230 post-active voiding trial per orders. 220cc sterile NS instilled into bladder. Patient was unable to void post-trial and was bladder scanned for 190cc.    Per MD-advised to give patient until 1400 to void and if unable to void, then reinsert rodriguez catheter.     Patient was unable to void by 1400. Rodriguez catheter reinserted per orders.     Pt will be discharged with rodriguez catheter and will follow up as outpatient.     Chuyita Pruitt RN on 2/26/2019 at 2:22 PM

## 2019-02-26 NOTE — PLAN OF CARE
[id number] Arrived to  from PACU around 1955. Pt ambulated from PACU cart to the bed with assist x2. Pt had sharp increased abdominal pain after arrival to the floor; pt moaning, tearful, and anxious r/t pain. MD made aware and increased IV Dilaudid dose. Adequate relief of pain following 0.5 mg IV Dilaudid and Tylenol given. Pain otherwise controlled with oxycodone and ibuprofen. Abdominal lap sites x5, primapore dressings CDI. Hypoactive bowel sounds. Denies nausea. PIV saline locked. Beltran in place with marginal but adequate output. On regular diet, tolerating sips of clear liquids and crackers. Pt refused capno in PACU, on continous pulse ox. Placed on 2 L O2 NC d/t desating while sleeping, OVSS.

## 2019-02-26 NOTE — PLAN OF CARE
Observation Goals:     Patient able to ambulate as they were prior to admission or with assist devices provided by therapies during their stay-GOAL MET.    Nurse to Notify Provider when Outpatient in a Bed discharge goals have been met and patient is ready for discharge-GOAL MET.       Pt discharged around 1705. X5 lap sites had clean/dry/intact dressings in place. Rodriguez w/ adequate UOP, reviewed rodriguez education packet w/ pt and their SO. Removed x2 PIVs. Reviewed discharge instructions w/ pt and their SO and answered their questions and provided clarifications. Pain meds given prior to discharge (prn tylenol, prn advil, prn oxycodone). Pt changed ind into clothing. Pt went in wheelchair pushed by SO. They planned on stopping by discharge pharm prior to leaving. All pt belongings went on wheelchair w/ pt.

## 2019-02-26 NOTE — PROGRESS NOTES
Observation Goals:     Patient able to ambulate as they were prior to admission or with assist devices provided by therapies during their stay. -NOT MET    Nurse to Notify Provider when Outpatient in a Bed discharge goals have been met and patient is ready for discharge. -NOT MET    Pt currently refusing to ambulate until next dose of PRN pain medication. PRN Ativan given @ 0830 and will administer next available dose of Tylenol @ 0945 and encourage ambulation.     Chuyita Pruitt RN on 2/26/2019 at 8:30 AM

## 2019-02-26 NOTE — PLAN OF CARE
Pt POD 1 Davinci assited rad hyst w/ lymph node biopsies. VSS, on 2L nasal cannula. No capno, pt refused. Pain improving, IV Dilaudid 0.5mg given at 0100, PRN Oxycodone, Tylenol, and Ibuprofen given throughout shift. Reported itchiness in legs and arms, 25mg PO diphenhydramine given. Beltran in place, low output. MD notified - ordered LR 500mL bolus. Infusing in R PIV. L PIV saline locked. Tolerating regular diet. PT ambulated before bedtime. No Bm. Abdominal incisions dry, intact. Continue POC.

## 2019-02-26 NOTE — PLAN OF CARE
Observation Goals:      Patient able to ambulate as they were prior to admission or with assist devices provided by therapies during their stay. -MET    Nurse to Notify Provider when Outpatient in a Bed discharge goals have been met and patient is ready for discharge. -MET    Patient able to ambulate independently and pain adequately managed w/ PRN Tylenol, PRN Ibuprofen, and PRN 10mg Oxycodone. Patient had PRN Ativan this AM for anxiety.    Tentative discharge this evening     Chuyita Pruitt RN on 2/26/2019 at 12:34 PM

## 2019-02-26 NOTE — PROGRESS NOTES
"Gynecologic Oncology Daily Progress Note    37yo POD#1 s/p dA rad hyst, BS, sentinel lymph node biopsies     24 hour events:  - procedure stated above  - significant post-op anxiety  - low urine output    S: Patient doing okay this morning. Pain is controlled but she has only ambulated once and it was \"too painful\". Had anxiety when she arrived to floor, improved when she changed rooms to her own room. Tolerated sips, no nausea/vomiting. Not yet passing gas    O:  Vitals:    02/26/19 0235 02/26/19 0348 02/26/19 0440 02/26/19 0600   BP: 100/48 101/57  118/66   BP Location: Right arm Right arm  Right arm   Cuff Size:  Adult Regular  Adult Regular   Pulse: 64      Resp:  13  14   Temp:   97.4  F (36.3  C) 96.6  F (35.9  C)   TempSrc:   Oral Oral   SpO2: 97% 96%  100%   Weight:       Height:         UOP: .6cc/kg/hr post-op > .3cc/kg/hr after MN     Gen: NAD, laying in bed   Cardio: RRR  Resp: CTAB  Abdomen: soft, tender to palpation, nondistended, no rebound tenderness, mild guarding   Incision: lsc incisions covered in clean bandages   Extremities: soft, nontender, SCDs in place     A: 38 year old POD#1 s/p davinci assisted radical hysterectomy with bilateral salpingectomy and sentinel lymph node biopsies.    Dz: At least stage 1B1 adenocarcinoma of the cervix. PET with hypermetabolic cervix. No evidence of mets. Indeterminant lung nodule.   FEN: Regular diet. LR@100cc/hr after 500cc bolus. Had been saline locked overnight   Pain: Acetaminophen, ibuprofen, oxycodone and dilaudid PRN  Heme: Hgb 14.1>EBL 50  CV: NI  Pulm: Hx asthma - PTA albuterol prn. Currently on RA.  Encourage IS. Indeterminate lung nodule: Will plan a followup CT in 3-6 months.  GI: PRN antiemetics and laxatives.   : Plan for active voiding trial today. If she fails, plan to replace rodriguez and repeat voiding trial on Friday. Would need macrobid ppx if catheter is replaced.   ID: Afebrile. Patient received preop antibiotics.   Endo: NI  Psych/Neuro: " Hx of ADD, depression and anxiety. Also hx of controlled substance agreement violation. PTA ativan ordered. PTA Ambien held.   PPX: SCDs   Dispo: Home pending post-op goals, likely today     Lizabeth Garcia MD   PGY-3 Ob/Gyn    Nasrin Salazar MD    Department of Ob/Gyn and Women's Health  Division of Gynecologic Oncology  [facility]  [phone]    I saw and evaluated the patient with the resident.  I edited and reviewed the above note.   POD #1 s/p robotic rad hyst, pain this am from incisions, trying trial of voids as patient wants to go home without rodriguez. . No flatus yet. Watching UO now.

## 2019-02-26 NOTE — PROVIDER NOTIFICATION
Notified Resident at 0400 regarding low urine output.      Spoke with: Fabiana Andrea.    Orders were obtained.    Comments: MD ordered 500ml bolus.

## 2019-02-27 ENCOUNTER — TELEPHONE (OUTPATIENT)
Dept: ONCOLOGY | Facility: CLINIC | Age: 39
End: 2019-02-27

## 2019-02-27 ENCOUNTER — CARE COORDINATION (OUTPATIENT)
Dept: ONCOLOGY | Facility: CLINIC | Age: 39
End: 2019-02-27

## 2019-02-28 NOTE — TELEPHONE ENCOUNTER
Patient reached out to patient per MyChart request to further discuss her pain.     Patient stated that she is in excruciating pain and this is the worst thing she has ever gone through. She said she is very anxious and scared to move due to the pain.     Pain is in two specific locations: near the right port sign and left lower quadrant. She describes this pain as sharp and intense pain.     Other than pain patient denies any signs and symptoms of concern.     RN reviewed the reasons why these two spots specifically can be painful and attempted to discuss signs and symptoms of concern.    Patient was very upset and stated that she felt her pain was not well controlled at the hospital and every time she would start to feel better they would make her extend the interval between when she could receive the medications.     She was frustrated at the level of her pain and wishes she had not done surgery and researched alternative options.     RN and patient discussed alternative pain management and signs and symptoms of concern.     Patient stated several times she is very fearful of running out of pain medication even though she has an appointment on Friday for a trial void.     Patient feels the pain is so great that she is afraid to move.     RN and patient discussed trying alternative pain management and if in the morning the combination of things does not significantly change her pain she may need to come to the ED here at the [facility].     RN and patient reviewed signs and symptoms of concern and when to seek immediate medical attention.     Mirian Redding RN

## 2019-02-28 NOTE — PROGRESS NOTES
RN reached out to patient for post hospital call. Patient unavailable. Voicemail is full. RN unable to leave a message.    Mirian Redding RN

## 2019-03-01 ENCOUNTER — ALLIED HEALTH/NURSE VISIT (OUTPATIENT)
Dept: ONCOLOGY | Facility: CLINIC | Age: 39
End: 2019-03-01
Attending: OBSTETRICS & GYNECOLOGY
Payer: COMMERCIAL

## 2019-03-01 VITALS — TEMPERATURE: 97.4 F

## 2019-03-01 DIAGNOSIS — C53.9 CERVICAL CANCER (H): Primary | ICD-10-CM

## 2019-03-01 DIAGNOSIS — Z90.710: ICD-10-CM

## 2019-03-01 LAB — COPATH REPORT: NORMAL

## 2019-03-01 PROCEDURE — 51700 IRRIGATION OF BLADDER: CPT

## 2019-03-01 RX ORDER — LORAZEPAM 1 MG/1
1 TABLET ORAL EVERY 8 HOURS PRN
Qty: 60 TABLET | Refills: 0 | Status: SHIPPED | OUTPATIENT
Start: 2019-03-01 | End: 2019-03-01

## 2019-03-01 RX ORDER — OXYCODONE HYDROCHLORIDE 5 MG/1
5-10 TABLET ORAL EVERY 6 HOURS PRN
Qty: 25 TABLET | Refills: 0 | Status: SHIPPED | OUTPATIENT
Start: 2019-03-01

## 2019-03-01 RX ORDER — LORAZEPAM 1 MG/1
1 TABLET ORAL EVERY 8 HOURS PRN
Qty: 15 TABLET | Refills: 0 | Status: SHIPPED | OUTPATIENT
Start: 2019-03-01

## 2019-03-01 NOTE — PROGRESS NOTES
Notified that patient has run out of pain medication and anxiety medication s/p radical hysterectomy. Taking oxycodone 10mg q4-6h PRN but is also taking lorazepam 1mg PRN. MN  reviewed. Discussed concerns with lorazepam and oxycodone use, not to take these medications together and to have at least two hours between lorazepam and oxycodone. Will re-order oxycodone and lorazepam to last until post-op appt with Dr. Michael on 3/5. No further refills from this writer. Narcan ordered as well. RN reviewed safety and Narcan with patient.  DAQUAN Ramos, FNP-C  Gynecologic Oncology  [facility]  Pager: [phone]

## 2019-03-01 NOTE — NURSING NOTE
MAGAÑA REMOVAL    Patient here today for removal of a Magaña catheter placed on 2- for failure to void after catheter removal in hospital .      In sterile fashion the bladder was back filled with 240cc of sterile water.     Balloon was deflated with a 10 cc syringe and 10cc of total fluid was withdrawn to deflate the balloon.  No exudate, redness, or swelling noted at the meatus.  The patient was instructed to take a deep breath and blow out through pursed lips as the catheter was removed.  The catheter was removed without any difficulty or resistance.     Patient was able to void 250cc.  Patient denied any burning or irritation while urinating. Patient felt she was able to empty completely.     Patient instructions:  1) Push fluids.  2) Signs of infection: urethral burning that dos not get better, frequency, burning with urination, fever/chills, malaise.  3) Return to clinic or ED if unable to void in 6-8 hours or if distended or if develops abdominal pain or signs of infection.    Patient gave verbal understanding of instructions and had no further questions.    Patient requested a refill of both Ativan and Oxycodone. Patient has NO tablets of either left.     Patient is taking 10mg of Oxycodone and 1 tab of Ativan together and states this is the only thing that is keeping her pain and anxiety tolerable. Patient states that she is so anxious about pain that she is unable to move.     7C was called to discuss how the patient took medication. It was verified that patient did take the two together.     Dr. Michael was also called and the above was discussed. Dr. Michael approved the refill of Ativan and Oxycodone. Dr. Michael is not on campus.    RN and NP in clinic discussed. NP wrote the refill with a change in recommendations. It was recommended that the patient not take oxycodone with Ativan. These medications should be spaced out by at least two hours. Narcan was also ordered.     This was discussed  with patient in great detail.     Patient agreed to the plan.    Mirian Redding RN

## 2019-03-04 NOTE — PROGRESS NOTES
GYN Oncology Followup     Referring provider:    Referred Self, MD  No address on file   RE:          Delicia Zhou  :       1980  KANIKA: 3/5/2019         CC: cervical adenocarcinoma     HPI: Ms Delicia Zhou is a 38 year old year old  female who presents for follow-up regarding cervical cancer.         Treatment History:   She notes a long history of abnormal pap smears. After BTL, did not have further followup (BTL was 8 years ago)   2018: heavy bleeding, got a pap smear (results not available)  2018; colposcopy  (results not available)   19: CKC with 1.3 x 0.8 cm of invasive adenocarcinoma of the cervix with associated AIS. No LVSI. Tumor extends to deep margin (path report reviewed)   19: PET with hypermetabolic cervix. No evidence of mets. Indeterminant lung nodule.   19: RA-MR hysterectomy, BS, bilateral sLND. Final Path: negative for malignancy; 0/2 left sLN, 0/1 right sLN    Today she feels fair. Feels very tired and dizzy. Has lateral abdominal tenderness. Using mostly ibuprofen (has 2-3 narcotic pills left). Also taking Ativan. Requests Ambien refills.   No bleeding.   Does not always feel the urge to void. Uses stool softeners and struggling with constipation.        Review of Systems:  Systemic:No weight changes.    Skin : No skin changes or new lesions.   Eye : No changes in vision.   Pulmonary: No cough or SOB.   Cardiovascular: No CP or palpitations  Gastrointestinal : No diarrhea, constipation, abdominal pain. Bowel habits normal.   Genitourinary: No dysuria, urgency. Had heavy bleeding which has now resolved.   Psychiatric: history of anxiety and depression  Hematologic : No palpable lymph nodes.   Endocrine : No hot flashes. No heat/cold intolerance.      Neurological: No headaches, no numbness.           Past Medical History:   Diagnosis Date     Anxiety 2012     2x week- 3x week     Attention deficit disorder (ADD) without hyperactivity 2017      Controlled substance agreement broken 3/13/2018     Intermittent asthma       seasonal + using albuterol      Mild recurrent major depression (H) 2018         Past Medical History:   Diagnosis Date     Anxiety 2012     Attention deficit disorder (ADD) without hyperactivity 2017     Cervical cancer (H)     Stage 1B1 adenocarcinoma     Controlled substance agreement broken 3/13/2018     Intermittent asthma     seasonal + using albuterol      Mild recurrent major depression (H) 2018            OBGYN history and Health Maintenance:    Last Pap Smear: long history of abnormal pap smears.   Last Mammogram:never  Last Colonoscopy: never             Current Outpatient Medications   Medication Sig Dispense Refill     albuterol (PROAIR HFA/PROVENTIL HFA/VENTOLIN HFA) 108 (90 BASE) MCG/ACT Inhaler Inhale 2 puffs into the lungs every 6 hours as needed for shortness of breath / dyspnea 1 Inhaler 6     amphetamine-dextroamphetamine (ADDERALL) 30 MG per tablet Take 1 tablet (30 mg) by mouth daily 30 tablet 0     ibuprofen (ADVIL/MOTRIN) 600 MG tablet Take 1 tablet (600 mg) by mouth every 6 hours as needed for other (mild and/or inflammatory pain) 30 tablet 0     LORazepam (ATIVAN) 1 MG tablet TK 1 T PO BID PRA   0     zolpidem (AMBIEN) 10 MG tablet                    Allergies   Allergen Reactions     No Known Drug Allergies           Social History:  Social History            Tobacco Use     Smoking status: Never Smoker     Smokeless tobacco: Never Used   Substance Use Topics     Alcohol use: Yes       Comment: social      Work:  at [employer]  Ethnicity identification:   Preferred language: english  Lives at home with: 3 daughters     Family History:   The patient's family history is notable for:         Family History   Problem Relation Age of Onset     Cancer Father           unknown type. smoker            Physical Exam:   /72   Pulse 95   Temp 97.7  F (36.5  C)  (Oral)   Wt 69.6 kg (153 lb 8 oz)   SpO2 100%   Breastfeeding? No   BMI 22.67 kg/m         General: Alert and oriented, no acute distress. Appears fatigued  Psych: Normal affect today  Cardiovascular: RRR, no murmors  Pulmonary: Lungs clear . Normal respiratory effort  GI: No distention. No masses. No hernia. Incisions c/d/i x 5  Lymph: No enlarge lymph nodes in neck or groin  : Normal external genitalia. Cuff in tact. No lesions.     FINAL DIAGNOSIS:   A. SENTINEL LYMPH NODE, LEFT, EXCISION:   - Two reactive lymph nodes, negative for malignancy (0/2)     B. SENTINEL LYMPH NODE, RIGHT, EXCISION:   - One reactive lymph node, negative for malignancy (0/1)     C. UTERUS, CERVIX, BILATERAL FALLOPIAN TUBES AND UPPER VAGINA, RADICAL   HYSTERECTOMY WITH BILATERAL   SALPINGECTOMY:   - Secretory endometrium   - Adenomyosis   - Cervix with reactive/reparative changes consistent with healed recent   surgery site, negative for malignancy   - Fallopian tubes with no significant histologic abnormality   - Simple, benign left paratubal cyst   - Vagina with no significant histologic abnormality   - Parametrial tissue with three reactive lymph nodes (0/3), negative for   malignancy     I have personally reviewed all specimens and/or slides, including the   listed special stains, and used them   with my medical judgement to determine or confirm the final diagnosis.     Electronically signed out by:     Kyle Carvalho M.D., PhD, Physicians       Assessment:     Delicia Zhou is a 38 year old woman with diagnosis of stage 1B1 (FIGO 2018)  cervical adenocarcinoma s/p RA- radical hysterectomy, BS, bilateral sLND.        Plan:      1.)   Stage 1B1 cervical adenocarcinoma (FIGO 2018): Final path reviewed and no evidence of residual disease. I am not recommending any adjuvant therapy. Post-treatment surveillance per SGO q 6 months x 1 year, then annually.  Annual cytology for detection of dysplasia. Imaging reserved for symptoms,  however will get CT in 3-6 months to follow-up on her lung nodule    - 6 month followup with me  - next cytology ~ 3/2020     2.)  Genetic risk factors were assessed: not indicated    3.)  Indeterminate lung nodule: Will plan a followup CT in 3-6 months    4.) Postop: Normal course. I do feel she should take at least one more week off of work. SHe is anxious to get back. Plan to return part time next week and full time the week after. No heavy lifting        Alexandra Michael MD    Department of Ob/Gyn and Women's Health  Division of Gynecologic Oncology  [facility]  [phone]      Total time spent with face to face with the patient today was 20 minutes, 5 minutes was doing post op follow up. Greater than 50% of the remaining 15 minutes was spent counseling/or coordinating care with the patient on issues described above.

## 2019-03-05 ENCOUNTER — OFFICE VISIT (OUTPATIENT)
Dept: ONCOLOGY | Facility: CLINIC | Age: 39
End: 2019-03-05
Attending: OBSTETRICS & GYNECOLOGY
Payer: COMMERCIAL

## 2019-03-05 VITALS
DIASTOLIC BLOOD PRESSURE: 72 MMHG | TEMPERATURE: 97.7 F | BODY MASS INDEX: 22.67 KG/M2 | WEIGHT: 153.5 LBS | SYSTOLIC BLOOD PRESSURE: 113 MMHG | OXYGEN SATURATION: 100 % | HEART RATE: 95 BPM

## 2019-03-05 DIAGNOSIS — Z90.710: ICD-10-CM

## 2019-03-05 PROCEDURE — 99213 OFFICE O/P EST LOW 20 MIN: CPT | Mod: 24 | Performed by: OBSTETRICS & GYNECOLOGY

## 2019-03-05 PROCEDURE — G0463 HOSPITAL OUTPT CLINIC VISIT: HCPCS | Mod: ZF

## 2019-03-05 RX ORDER — ZOLPIDEM TARTRATE 10 MG/1
5 TABLET ORAL
Qty: 10 TABLET | Refills: 0 | Status: SHIPPED | OUTPATIENT
Start: 2019-03-05

## 2019-03-05 ASSESSMENT — PAIN SCALES - GENERAL: PAINLEVEL: MODERATE PAIN (4)

## 2019-03-05 NOTE — LETTER
3/5/2019       RE: Delicia Zhou  [address] [place] [address]     Dear Colleague,    Thank you for referring your patient, Delicia Zhou, to the [facility]. Please see a copy of my visit note below.    GYN Oncology Followup     Referring provider:    Referred Self, MD  No address on file   RE:          Delicia Zhou  :       1980  KANIKA:  3/5/2019         CC: cervical adenocarcinoma     HPI: Ms Delicia Zhou is a 38 year old year old  female who presents for follow-up regarding cervical cancer.        Treatment History:   She notes a long history of abnormal pap smears. After BTL, did not have further followup (BTL was 8 years ago)   2018: heavy bleeding, got a pap smear (results not available)  2018; colposcopy  (results not available)   19: CKC with 1.3 x 0.8 cm of invasive adenocarcinoma of the cervix with associated AIS. No LVSI. Tumor extends to deep margin (path report reviewed)   19: PET with hypermetabolic cervix. No evidence of mets. Indeterminant lung nodule.   19: RA-MR hysterectomy, BS, bilateral sLND. Final Path: negative for malignancy; 0/2 left sLN, 0/1 right sLN    Today she feels fair. Feels very tired and dizzy. Has lateral abdominal tenderness. Using mostly ibuprofen (has 2-3 narcotic pills left). Also taking Ativan. Requests Ambien refills.   No bleeding.   Does not always feel the urge to void. Uses stool softeners and struggling with constipation.        Review of Systems:  Systemic:No weight changes.    Skin : No skin changes or new lesions.   Eye : No changes in vision.   Pulmonary: No cough or SOB.   Cardiovascular: No CP or palpitations  Gastrointestinal : No diarrhea, constipation, abdominal pain. Bowel habits normal.   Genitourinary: No dysuria, urgency. Had heavy bleeding which has now resolved.   Psychiatric: history of anxiety and depression  Hematologic : No palpable lymph nodes.   Endocrine : No hot flashes. No  heat/cold intolerance.      Neurological: No headaches, no numbness.           Past Medical History:   Diagnosis Date     Anxiety 2012     2x week- 3x week     Attention deficit disorder (ADD) without hyperactivity 2017     Controlled substance agreement broken 3/13/2018     Intermittent asthma       seasonal + using albuterol      Mild recurrent major depression (H) 2018         Past Medical History:   Diagnosis Date     Anxiety 2012     Attention deficit disorder (ADD) without hyperactivity 2017     Cervical cancer (H)     Stage 1B1 adenocarcinoma     Controlled substance agreement broken 3/13/2018     Intermittent asthma     seasonal + using albuterol      Mild recurrent major depression (H) 2018            OBGYN history and Health Maintenance:    Last Pap Smear: long history of abnormal pap smears.   Last Mammogram:never  Last Colonoscopy: never             Current Outpatient Medications   Medication Sig Dispense Refill     albuterol (PROAIR HFA/PROVENTIL HFA/VENTOLIN HFA) 108 (90 BASE) MCG/ACT Inhaler Inhale 2 puffs into the lungs every 6 hours as needed for shortness of breath / dyspnea 1 Inhaler 6     amphetamine-dextroamphetamine (ADDERALL) 30 MG per tablet Take 1 tablet (30 mg) by mouth daily 30 tablet 0     ibuprofen (ADVIL/MOTRIN) 600 MG tablet Take 1 tablet (600 mg) by mouth every 6 hours as needed for other (mild and/or inflammatory pain) 30 tablet 0     LORazepam (ATIVAN) 1 MG tablet TK 1 T PO BID PRA   0     zolpidem (AMBIEN) 10 MG tablet                    Allergies   Allergen Reactions     No Known Drug Allergies           Social History:  Social History            Tobacco Use     Smoking status: Never Smoker     Smokeless tobacco: Never Used   Substance Use Topics     Alcohol use: Yes       Comment: social      Work:  at car dealership  Ethnicity identification:   Preferred language: english  Lives at home with: 3 daughters     Family  History:   The patient's family history is notable for:         Family History   Problem Relation Age of Onset     Cancer Father           unknown type. smoker            Physical Exam:   /72   Pulse 95   Temp 97.7  F (36.5  C) (Oral)   Wt 69.6 kg (153 lb 8 oz)   SpO2 100%   Breastfeeding? No   BMI 22.67 kg/m          General: Alert and oriented, no acute distress. Appears fatigued  Psych: Normal affect today  Cardiovascular: RRR, no murmors  Pulmonary: Lungs clear . Normal respiratory effort  GI: No distention. No masses. No hernia. Incisions c/d/i x 5  Lymph: No enlarge lymph nodes in neck or groin  : Normal external genitalia. Cuff in tact. No lesions.     FINAL DIAGNOSIS:   A. SENTINEL LYMPH NODE, LEFT, EXCISION:   - Two reactive lymph nodes, negative for malignancy (0/2)     B. SENTINEL LYMPH NODE, RIGHT, EXCISION:   - One reactive lymph node, negative for malignancy (0/1)     C. UTERUS, CERVIX, BILATERAL FALLOPIAN TUBES AND UPPER VAGINA, RADICAL   HYSTERECTOMY WITH BILATERAL   SALPINGECTOMY:   - Secretory endometrium   - Adenomyosis   - Cervix with reactive/reparative changes consistent with healed recent   surgery site, negative for malignancy   - Fallopian tubes with no significant histologic abnormality   - Simple, benign left paratubal cyst   - Vagina with no significant histologic abnormality   - Parametrial tissue with three reactive lymph nodes (0/3), negative for   malignancy     I have personally reviewed all specimens and/or slides, including the   listed special stains, and used them   with my medical judgement to determine or confirm the final diagnosis.     Electronically signed out by:     Kyle Carvalho M.D., PhD, Physicians       Assessment:     Delicia Zhou is a 38 year old woman with diagnosis of stage 1B1 (FIGO 2018)  cervical adenocarcinoma s/p RA- radical hysterectomy, BS, bilateral sLND.        Plan:      1.)    Stage 1B1 cervical adenocarcinoma (FIGO 2018): Final path  reviewed and no evidence of residual disease. I am not recommending any adjuvant therapy. Post-treatment surveillance per SGO q 6 months x 1 year, then annually.  Annual cytology for detection of dysplasia. Imaging reserved for symptoms, however will get CT in 3-6 months to follow-up on her lung nodule    - 6 month followup with me  - next cytology ~ 3/2020     2.)  Genetic risk factors were assessed: not indicated    3.)  Indeterminate lung nodule: Will plan a followup CT in 3-6 months    4.) Postop: Normal course. I do feel she should take at least one more week off of work. SHe is anxious to get back. Plan to return part time next week and full time the week after. No heavy lifting     Alexandra Michael MD    Department of Ob/Gyn and Women's Health  Division of Gynecologic Oncology  [facility]  [phone]    Total time spent with face to face with the patient today was 20 minutes, 5 minutes was doing post op follow up. Greater than 50% of the remaining 15 minutes was spent counseling/or coordinating care with the patient on issues described above.

## 2019-03-05 NOTE — LETTER
To Whom It MayConcern:       Delicia Zhou  was seen in our clinic on 3/5/2019        Patient may return to work on 11, 2019 part time ONLY.                             Restrictions:   Starting March 11, 2019 Delicia may work as tolerated with a max of 20 hours for the week. NO heavy lifting (greater than 10 pounds)    Comments:   May return FULL time starting March 18, 2019. Lifting restriction stays in place UNTIL April 8, 2019         Provider Signature:         Alexandra Michael MD

## 2019-03-05 NOTE — NURSING NOTE
"Oncology Rooming Note    March 5, 2019 10:37 AM   Delicia Zhou is a 38 year old female who presents for:    Chief Complaint   Patient presents with     Oncology Clinic Visit     Return; Hysterectomy Post op     Initial Vitals: /72   Pulse 95   Temp 97.7  F (36.5  C) (Oral)   Wt 69.6 kg (153 lb 8 oz)   SpO2 100%   Breastfeeding? No   BMI 22.67 kg/m   Estimated body mass index is 22.67 kg/m  as calculated from the following:    Height as of 2/25/19: 1.753 m (5' 9\").    Weight as of this encounter: 69.6 kg (153 lb 8 oz). Body surface area is 1.84 meters squared.  Moderate Pain (4) Comment: Data Unavailable   No LMP recorded.  Allergies reviewed: Yes  Medications reviewed: Yes    Medications: MEDICATION REFILLS NEEDED TODAY. Provider was notified.  Pharmacy name entered into EPIC:    [facility]  [facility] [address] [place] [address] AT [facility] OF  [address]  [facility] [address] [place] [address]    Clinical concerns: Patient requests refill of Ambien today. No New Concerns Michael was notified.      Sola Booth CMA              "

## 2019-10-02 ENCOUNTER — HEALTH MAINTENANCE LETTER (OUTPATIENT)
Age: 39
End: 2019-10-02

## 2020-03-22 ENCOUNTER — HEALTH MAINTENANCE LETTER (OUTPATIENT)
Age: 40
End: 2020-03-22

## 2021-01-15 ENCOUNTER — HEALTH MAINTENANCE LETTER (OUTPATIENT)
Age: 41
End: 2021-01-15

## 2021-09-05 ENCOUNTER — HEALTH MAINTENANCE LETTER (OUTPATIENT)
Age: 41
End: 2021-09-05

## 2022-02-19 ENCOUNTER — HEALTH MAINTENANCE LETTER (OUTPATIENT)
Age: 42
End: 2022-02-19

## 2022-10-22 ENCOUNTER — HEALTH MAINTENANCE LETTER (OUTPATIENT)
Age: 42
End: 2022-10-22

## 2023-04-01 ENCOUNTER — HEALTH MAINTENANCE LETTER (OUTPATIENT)
Age: 43
End: 2023-04-01

## 2024-03-24 ENCOUNTER — HEALTH MAINTENANCE LETTER (OUTPATIENT)
Age: 44
End: 2024-03-24

## (undated) DEVICE — SYSTEM CLEARIFY VISUALIZATION 21-345

## (undated) DEVICE — LINEN TOWEL PACK X5 5464

## (undated) DEVICE — SU VICRYL 2-0 CT-2 27" J333H

## (undated) DEVICE — DAVINCI XI OBTURATOR BLADELESS 8MM 470359

## (undated) DEVICE — DAVINCI XI SEAL UNIVERSAL 5-8MM 470361

## (undated) DEVICE — SU STRATAFIX PDS PLUS 0 SPIRAL CT-1 9" 22CM SXPP1B455

## (undated) DEVICE — ESU GROUND PAD ADULT REM W/15' CORD E7507DB

## (undated) DEVICE — ESU ELEC LEEP BALL 5MM

## (undated) DEVICE — SOL ADH LIQUID BENZOIN SWAB 0.6ML C1544

## (undated) DEVICE — SPECIMEN TRAP MUCOUS 40ML LUKI C30200A

## (undated) DEVICE — DAVINCI XI GRASPER ENDOWRIST PROGRASP 470093

## (undated) DEVICE — RETR ELEV / UTERINE MANIPULATOR V-CARE LG CUP 60-6085-202A

## (undated) DEVICE — DAVINCI XI DRAPE COLUMN 470341

## (undated) DEVICE — ESU PENCIL W/HOLSTER E2350H

## (undated) DEVICE — ESU GROUND PAD UNIVERSAL W/O CORD

## (undated) DEVICE — LINEN TOWEL PACK X30 5481

## (undated) DEVICE — ENDO TROCAR FIRST ENTRY KII FIOS Z-THRD 12X100MM CTF73

## (undated) DEVICE — DAVINCI XI DRAPE ARM 470015

## (undated) DEVICE — PREP CHLORAPREP 26ML TINTED ORANGE  260815

## (undated) DEVICE — SYR 01ML 27GA 0.5" NDL TBC 309623

## (undated) DEVICE — LINEN TOWEL PACK X6 WHITE 5487

## (undated) DEVICE — NDL SPINAL 18GA 3.5" 405184

## (undated) DEVICE — GLOVE PROTEXIS MICRO 7.0  2D73PM70

## (undated) DEVICE — WIPES FOLEY CARE SURESTEP PROVON DFC100

## (undated) DEVICE — PREP POVIDONE IODINE SOLUTION 10% 4OZ

## (undated) DEVICE — JELLY LUBRICATING SURGILUBE 2OZ TUBE

## (undated) DEVICE — SU ETHILON 3-0 FS-1 18" 669H

## (undated) DEVICE — SUCTION TIP YANKAUER W/O VENT K86

## (undated) DEVICE — DEVICE SUTURE GRASPER TROCAR CLOSURE 14GA PMITCSG

## (undated) DEVICE — NDL SPINAL 22GA 3.5" QUINCKE 405181

## (undated) DEVICE — PACK TVT HYSTEROSCOPY SMA15HYFSE

## (undated) DEVICE — GLOVE PROTEXIS W/NEU-THERA 6.5  2D73TE65

## (undated) DEVICE — DRSG PRIMAPORE 02X3" 7133

## (undated) DEVICE — KIT PATIENT POSITIONING PIGAZZI LATEX FREE 40580

## (undated) DEVICE — SOL NACL 0.9% INJ 1000ML BAG 2B1324X

## (undated) DEVICE — DAVINCI HOT SHEARS TIP COVER  400180

## (undated) DEVICE — SOL WATER IRRIG 1000ML BOTTLE 2F7114

## (undated) DEVICE — SYR 05ML LL W/O NDL

## (undated) DEVICE — SU VICRYL 4-0 PS-2 18" UND J496H

## (undated) DEVICE — ESU PENCIL W/COATED BLADE E2450H

## (undated) DEVICE — DRAPE SHEET REV FOLD 3/4 9349

## (undated) DEVICE — GLOVE PROTEXIS BLUE W/NEU-THERA 6.5  2D73EB65

## (undated) DEVICE — TUBING SUCTION 12"X1/4" N612

## (undated) DEVICE — DRAPE ISOLATION BAG 1003

## (undated) DEVICE — TUBING SMOKE EVAC .635CMX3M SEA3705

## (undated) DEVICE — SU VICRYL 0 TIE 54" J608H

## (undated) DEVICE — SOL NACL 0.9% IRRIG 1000ML BOTTLE 07138-09

## (undated) DEVICE — NDL INSUFFLATION 13GA 120MM C2201

## (undated) DEVICE — SYR 10ML LL W/O NDL 302995

## (undated) DEVICE — ENDO POUCH UNIVERSAL RETRIEVAL SYSTEM INZII 5MM CD003

## (undated) DEVICE — BLADE KNIFE BEAVER CERVICAL  379100

## (undated) DEVICE — CATH TRAY FOLEY 16FR W/URINE METER STATLOCK 303316A

## (undated) DEVICE — NDL BLUNT 18GA 1" W/O FILTER 305181

## (undated) DEVICE — SYR 10ML FINGER CONTROL W/O NDL 309695

## (undated) DEVICE — PACK GOWN 3/PK DISP XL SBA32GPFCB

## (undated) DEVICE — ENDO TROCAR FIRST ENTRY KII FIOS Z-THRD 05X100MM CTF03

## (undated) DEVICE — NDL COUNTER 10CT

## (undated) DEVICE — PROTECTOR ARM ONE-STEP TRENDELENBURG 40418

## (undated) DEVICE — DAVINCI XI MONOPOLAR SCISSORS HOT SHEARS 8MM 470179

## (undated) DEVICE — DRAPE MAYO STAND 23X54 8337

## (undated) DEVICE — DAVINCI XI FCP BIPOLAR FENESTRATED 470205

## (undated) DEVICE — PREP POVIDONE IODINE SCRUB 7.5% 4OZ APL82212

## (undated) DEVICE — Device

## (undated) DEVICE — BLADE KNIFE SURG 15 371115

## (undated) DEVICE — SUCTION IRR STRYKERFLOW II W/TIP 250-070-520

## (undated) DEVICE — KOH COLPOTOMIZER OCCLUDER  CPO-6

## (undated) RX ORDER — FENTANYL CITRATE 50 UG/ML
INJECTION, SOLUTION INTRAMUSCULAR; INTRAVENOUS
Status: DISPENSED
Start: 2019-02-25

## (undated) RX ORDER — HEPARIN SODIUM 1000 [USP'U]/ML
INJECTION, SOLUTION INTRAVENOUS; SUBCUTANEOUS
Status: DISPENSED
Start: 2019-02-25

## (undated) RX ORDER — INDOCYANINE GREEN AND WATER 25 MG
KIT INJECTION
Status: DISPENSED
Start: 2019-02-25

## (undated) RX ORDER — LIDOCAINE HYDROCHLORIDE 20 MG/ML
INJECTION, SOLUTION EPIDURAL; INFILTRATION; INTRACAUDAL; PERINEURAL
Status: DISPENSED
Start: 2019-02-26

## (undated) RX ORDER — DEXAMETHASONE SODIUM PHOSPHATE 4 MG/ML
INJECTION, SOLUTION INTRA-ARTICULAR; INTRALESIONAL; INTRAMUSCULAR; INTRAVENOUS; SOFT TISSUE
Status: DISPENSED
Start: 2019-02-25

## (undated) RX ORDER — HYDROMORPHONE HYDROCHLORIDE 1 MG/ML
INJECTION, SOLUTION INTRAMUSCULAR; INTRAVENOUS; SUBCUTANEOUS
Status: DISPENSED
Start: 2019-02-25

## (undated) RX ORDER — ONDANSETRON 2 MG/ML
INJECTION INTRAMUSCULAR; INTRAVENOUS
Status: DISPENSED
Start: 2019-01-22

## (undated) RX ORDER — DEXAMETHASONE SODIUM PHOSPHATE 4 MG/ML
INJECTION, SOLUTION INTRA-ARTICULAR; INTRALESIONAL; INTRAMUSCULAR; INTRAVENOUS; SOFT TISSUE
Status: DISPENSED
Start: 2019-01-22

## (undated) RX ORDER — FENTANYL CITRATE 50 UG/ML
INJECTION, SOLUTION INTRAMUSCULAR; INTRAVENOUS
Status: DISPENSED
Start: 2019-01-22

## (undated) RX ORDER — ONDANSETRON 2 MG/ML
INJECTION INTRAMUSCULAR; INTRAVENOUS
Status: DISPENSED
Start: 2019-02-25

## (undated) RX ORDER — CEFAZOLIN SODIUM 2 G/100ML
INJECTION, SOLUTION INTRAVENOUS
Status: DISPENSED
Start: 2019-02-25

## (undated) RX ORDER — PHENYLEPHRINE HCL IN 0.9% NACL 1 MG/10 ML
SYRINGE (ML) INTRAVENOUS
Status: DISPENSED
Start: 2019-02-25

## (undated) RX ORDER — PROPOFOL 10 MG/ML
INJECTION, EMULSION INTRAVENOUS
Status: DISPENSED
Start: 2019-01-22

## (undated) RX ORDER — HYDROMORPHONE HYDROCHLORIDE 1 MG/ML
INJECTION, SOLUTION INTRAMUSCULAR; INTRAVENOUS; SUBCUTANEOUS
Status: DISPENSED
Start: 2019-01-22

## (undated) RX ORDER — PROPOFOL 10 MG/ML
INJECTION, EMULSION INTRAVENOUS
Status: DISPENSED
Start: 2019-02-26

## (undated) RX ORDER — CEFAZOLIN SODIUM 1 G/3ML
INJECTION, POWDER, FOR SOLUTION INTRAMUSCULAR; INTRAVENOUS
Status: DISPENSED
Start: 2019-02-25

## (undated) RX ORDER — DIPHENHYDRAMINE HYDROCHLORIDE 50 MG/ML
INJECTION INTRAMUSCULAR; INTRAVENOUS
Status: DISPENSED
Start: 2019-02-25

## (undated) RX ORDER — LIDOCAINE HYDROCHLORIDE 20 MG/ML
INJECTION, SOLUTION EPIDURAL; INFILTRATION; INTRACAUDAL; PERINEURAL
Status: DISPENSED
Start: 2019-01-22